# Patient Record
Sex: FEMALE | Race: WHITE | Employment: FULL TIME | URBAN - METROPOLITAN AREA
[De-identification: names, ages, dates, MRNs, and addresses within clinical notes are randomized per-mention and may not be internally consistent; named-entity substitution may affect disease eponyms.]

---

## 2017-05-22 DIAGNOSIS — E78.00 PURE HYPERCHOLESTEROLEMIA: Chronic | ICD-10-CM

## 2017-05-22 DIAGNOSIS — Z00.00 ANNUAL PHYSICAL EXAM: Primary | ICD-10-CM

## 2017-05-22 RX ORDER — LEVOTHYROXINE SODIUM 88 UG/1
88 TABLET ORAL
Qty: 90 TAB | Refills: 0 | Status: SHIPPED | OUTPATIENT
Start: 2017-05-22 | End: 2017-12-12 | Stop reason: SDUPTHER

## 2017-05-22 NOTE — TELEPHONE ENCOUNTER
Patient requesting refill     Requested Prescriptions     Pending Prescriptions Disp Refills    levothyroxine (SYNTHROID) 88 mcg tablet 90 Tab 2     Sig: Take 1 Tab by mouth Daily (before breakfast).

## 2017-06-26 ENCOUNTER — TELEPHONE (OUTPATIENT)
Dept: FAMILY MEDICINE CLINIC | Age: 57
End: 2017-06-26

## 2017-06-28 ENCOUNTER — OFFICE VISIT (OUTPATIENT)
Dept: FAMILY MEDICINE CLINIC | Age: 57
End: 2017-06-28

## 2017-06-28 VITALS
WEIGHT: 160 LBS | RESPIRATION RATE: 16 BRPM | HEART RATE: 73 BPM | TEMPERATURE: 97.7 F | HEIGHT: 64 IN | OXYGEN SATURATION: 97 % | DIASTOLIC BLOOD PRESSURE: 90 MMHG | BODY MASS INDEX: 27.31 KG/M2 | SYSTOLIC BLOOD PRESSURE: 140 MMHG

## 2017-06-28 DIAGNOSIS — F32.0 MILD SINGLE CURRENT EPISODE OF MAJOR DEPRESSIVE DISORDER (HCC): ICD-10-CM

## 2017-06-28 DIAGNOSIS — E03.4 HYPOTHYROIDISM DUE TO ACQUIRED ATROPHY OF THYROID: Primary | ICD-10-CM

## 2017-06-28 DIAGNOSIS — Z91.09 ENVIRONMENTAL ALLERGIES: ICD-10-CM

## 2017-06-28 DIAGNOSIS — Z91.018: ICD-10-CM

## 2017-06-28 DIAGNOSIS — I10 ESSENTIAL HYPERTENSION: Chronic | ICD-10-CM

## 2017-06-28 RX ORDER — ESCITALOPRAM OXALATE 5 MG/1
5 TABLET ORAL DAILY
Qty: 30 TAB | Refills: 1 | Status: SHIPPED | OUTPATIENT
Start: 2017-06-28 | End: 2017-12-12 | Stop reason: SDUPTHER

## 2017-06-28 RX ORDER — MONTELUKAST SODIUM 10 MG/1
10 TABLET ORAL DAILY
Qty: 30 TAB | Refills: 1 | Status: SHIPPED | OUTPATIENT
Start: 2017-06-28 | End: 2017-08-06 | Stop reason: SDUPTHER

## 2017-06-28 NOTE — PROGRESS NOTES
Maxime Arizmendi is a 49 y.o. female here today for thyroid check and fatigue     1. Have you been to the ER, urgent care clinic or hospitalized since your last visit? NO.     2. Have you seen or consulted any other health care providers outside of the [address] [place] [address] since your last visit (Include any pap smears or colon screening)? NO      Do you have an Advanced Directive? NO    Would you like information on Advanced Directives?  NO    Learning Assessment 5/26/2015   PRIMARY LEARNER Patient   HIGHEST LEVEL OF EDUCATION - PRIMARY LEARNER  -   BARRIERS PRIMARY LEARNER -   CO-LEARNER CAREGIVER -   PRIMARY LANGUAGE ENGLISH   LEARNER PREFERENCE PRIMARY LISTENING   ANSWERED BY patient   RELATIONSHIP SELF

## 2017-06-28 NOTE — MR AVS SNAPSHOT
Visit Information Date & Time Provider Department Dept. Phone Encounter #  
 6/28/2017 11:15 AM Taylor Tran, 3 [facility] [phone] [id number] Your Appointments 7/28/2017  8:15 AM  
PHYSICAL with Taylor Tran MD  
3 [facility]) Appt Note: CPE  
 [address] [facility] [id number] [phone]  
  
   
 [address] [address] [address] Upcoming Health Maintenance Date Due Hepatitis C Screening 1960 INFLUENZA AGE 9 TO ADULT 8/1/2017 PAP AKA CERVICAL CYTOLOGY 12/23/2017 BREAST CANCER SCRN MAMMOGRAM 8/4/2018 COLONOSCOPY 5/18/2026 DTaP/Tdap/Td series (2 - Td) 7/27/2026 Allergies as of 6/28/2017  Review Complete On: 6/28/2017 By: Keesha Reed LPN Severity Noted Reaction Type Reactions Ancef [Cefazolin]  01/16/2010   Intolerance Other (comments)  
 headaches Bactrim [Sulfamethoxazole-trimethoprim]  01/16/2010   Intolerance Other (comments)  
 headache Eryc [Erythromycin]  01/16/2010   Side Effect Rash Lisinopril  04/04/2016    Swelling Pcn [Penicillins]  01/16/2010   Side Effect Unknown (comments) Probiotic [Lactobacillus Acidophilus]  04/04/2016    Angioedema Soybean Oil  07/27/2016    Hives Current Immunizations  Never Reviewed Name Date Tdap 7/27/2016  9:11 AM  
  
 Not reviewed this visit You Were Diagnosed With   
  
 Codes Comments Hypothyroidism due to acquired atrophy of thyroid    -  Primary ICD-10-CM: E03.4 ICD-9-CM: 244.8, 246.8 Essential hypertension     ICD-10-CM: I10 
ICD-9-CM: 401.9 Environmental allergies     ICD-10-CM: Z91.09 
ICD-9-CM: V15.09 Soy protein sensitivity     ICD-10-CM: Z91.018 
ICD-9-CM: V15.05 Mild single current episode of major depressive disorder (HCC)     ICD-10-CM: F32.0 ICD-9-CM: 296.21 Vitals BP Pulse Temp Resp Height(growth percentile) Weight(growth percentile) 140/90 (BP 1 Location: Left arm, BP Patient Position: Sitting) 73 97.7 °F (36.5 °C) (Oral) 16 5' 4\" (1.626 m) 160 lb (72.6 kg) SpO2 BMI OB Status Smoking Status 97% 27.46 kg/m2 Menopause Never Smoker Vitals History BMI and BSA Data Body Mass Index Body Surface Area  
 27.46 kg/m 2 1.81 m 2 [facility] [address] [facility], [address] [phone] Your Updated Medication List  
  
   
This list is accurate as of: 6/28/17 12:05 PM.  Always use your most recent med list.  
  
  
  
  
 COQ10 (UBIQUINOL) PO Take  by mouth daily. desloratadine 5 mg tablet Commonly known as:  CLARINEX Take 1 Tab by mouth daily. escitalopram oxalate 5 mg tablet Commonly known as:  Severa Huge Take 1 Tab by mouth daily. Indications: Obsessive-Compulsive Disorder  
  
 levothyroxine 88 mcg tablet Commonly known as:  SYNTHROID Take 1 Tab by mouth Daily (before breakfast). losartan-hydroCHLOROthiazide 50-12.5 mg per tablet Commonly known as:  HYZAAR  
take 1 tablet by mouth once daily  
  
 montelukast 10 mg tablet Commonly known as:  SINGULAIR Take 1 Tab by mouth daily. multivitamin tablet Commonly known as:  ONE A DAY Take 1 tablet by mouth daily. rosuvastatin 10 mg tablet Commonly known as:  CRESTOR Take 1 Tab by mouth nightly. Prescriptions Sent to Pharmacy Refills  
 escitalopram oxalate (LEXAPRO) 5 mg tablet 1 Sig: Take 1 Tab by mouth daily. Indications: Obsessive-Compulsive Disorder Class: Normal  
 Pharmacy: [id number] [address] [facility], [address] [facility] Ph #: [phone] Route: Oral  
 montelukast (SINGULAIR) 10 mg tablet 1 Sig: Take 1 Tab by mouth daily.   
 Class: Normal  
 Pharmacy: [facility] [address], [address] Kristy Stout MIGEL.  #: [phone] Route: Oral  
  
We Performed the Following REFERRAL TO NUTRITION [REF50 Custom] To-Do List   
 06/28/2017 Imaging:  US THYROID/PARATHYROID/SOFT TISS Referral Information Referral ID Referred By Referred To  
  
 [id number] Amina STONE Not Available Visits Status Start Date End Date 1 New Request 6/28/17 6/28/18 If your referral has a status of pending review or denied, additional information will be sent to support the outcome of this decision. Patient Instructions Hypothyroidism: Care Instructions Your Care Instructions You have hypothyroidism, which means that your body is not making enough thyroid hormone. This hormone helps your body use energy. If your thyroid level is low, you may feel tired, be constipated, have an increase in your blood pressure, or have dry skin or memory problems. You may also get cold easily, even when it is warm. Women with low thyroid levels may have heavy menstrual periods. A blood test to find your thyroid-stimulating hormone (TSH) level is used to check for hypothyroidism. A high TSH level may mean that you have low thyroid. When your body is not making enough thyroid hormone, TSH levels rise in an effort to make the body produce more. The treatment for hypothyroidism is to take thyroid hormone pills. You should start to feel better in 1 to 2 weeks. But it can take several months to see changes in the TSH level. You will need regular visits with your doctor to make sure you have the right dose of medicine. Most people need treatment for the rest of their lives. You will need to see your doctor regularly to have blood tests and to make sure you are doing well. Follow-up care is a key part of your treatment and safety. Be sure to make and go to all appointments, and call your doctor if you are having problems.  Its also a good idea to know your test results and keep a list of the medicines you take. How can you care for yourself at home? · Take your thyroid hormone medicine exactly as prescribed. Call your doctor if you think you are having a problem with your medicine. Most people do not have side effects if they take the right amount of medicine regularly. ¨ Take the medicine 30 minutes before breakfast, and do not take it with calcium, vitamins, or iron. ¨ Do not take extra doses of your thyroid medicine. It will not help you get better any faster, and it may cause side effects. ¨ If you forget to take a dose, do NOT take a double dose of medicine. Take your usual dose the next day. · Tell your doctor about all prescription, herbal, or over-the-counter products you take. · Take care of yourself. Eat a healthy diet, get enough sleep, and get regular exercise. When should you call for help? Call 911 anytime you think you may need emergency care. For example, call if: 
· You passed out (lost consciousness). · You have severe trouble breathing. · You have a very slow heartbeat (less than 60 beats a minute). · You have a low body temperature (95°F or below). Call your doctor now or seek immediate medical care if: 
· You feel tired, sluggish, or weak. · You have trouble remembering things or concentrating. · You do not begin to feel better 2 weeks after starting your medicine. Watch closely for changes in your health, and be sure to contact your doctor if you have any problems. Where can you learn more? Go to http://nicloa-kobe.info/. Enter L875 in the search box to learn more about \"Hypothyroidism: Care Instructions. \" Current as of: July 28, 2016 Content Version: 11.3 © 7361-4859 [email or web address]. Care instructions adapted under license by [employer] (which disclaims liability or warranty for this information).  If you have questions about a medical condition or this instruction, always ask your healthcare professional. Hayder Mcgee any warranty or liability for your use of this information. Introducing [facility]! Dear Ginger Fletcher: Thank you for requesting a Edyn account. Our records indicate that you already have an active Edyn account. You can access your account anytime at [email or web address] Did you know that you can access your hospital and ER discharge instructions at any time in Edyn? You can also review all of your test results from your hospital stay or ER visit. Additional Information If you have questions, please visit the Frequently Asked Questions section of the Edyn website at [email or web address] Remember, Edyn is NOT to be used for urgent needs. For medical emergencies, dial 911. Now available from your iPhone and Android! Please provide this summary of care documentation to your next provider. Your primary care clinician is listed as Nichelle Hoskins. If you have any questions after today's visit, please call [phone].

## 2017-06-28 NOTE — PROGRESS NOTES
Assessment/Plan:    Hilda Tucker was seen today for thyroid problem, fatigue and allergies. Diagnoses and all orders for this visit:    Hypothyroidism due to acquired atrophy of thyroid  -     US THYROID/PARATHYROID/SOFT TISS; Future    Essential hypertension    Environmental allergies  -     montelukast (SINGULAIR) 10 mg tablet; Take 1 Tab by mouth daily. Soy protein sensitivity  -     REFERRAL TO NUTRITION    Mild single current episode of major depressive disorder (HCC)  -     escitalopram oxalate (LEXAPRO) 5 mg tablet; Take 1 Tab by mouth daily. Indications: Obsessive-Compulsive Disorder      Will return for her physical and do labs. Will call her prior to visit with her Thyroid levels. The plan was discussed with the patient. The patient verbalized understanding and is in agreement with the plan. All medication potential side effects were discussed with the patient.    -------------------------------------------------------------------------------------------------------------------        Elizabeth Reed is a 49 y.o. female and presents with Thyroid Problem; Fatigue; and Allergies         Subjective:  Pt here for f/u. Pt has regained weight. Has been disappointed in this, feeling down. She has found it a struggle to find foods w/o soy. She came to realize she was allergic to this months ago. the strict diet that she has to adhere to has made it hard to go out to dinner, go to other people's homes for dinner, just makes life difficult. She has a soy allergy. She develops rashes, swelling of the body so she has to be careful. Has had to use epipen in the past.  The amount she consumes is the issue. Any oils with soy will always affect her. Hypothyroidism:  Has also been having a heaviness in her throat/neck. Has not had an US. HTN: borderline. She feels her mood has been affected by things above. Feels she needs to go on medication.   She has been on meds years ago.      ROS:  Constitutional: No recent weight change. No weakness/fatigue. No f/c. Skin: No rashes, change in nails/hair, itching   HENT: No HA, dizziness. No hearing loss/tinnitus. No nasal congestion/discharge. Eyes: No change in vision, double/blurred vision or eye pain/redness. Cardiovascular: No CP/palpitations. No HIDALGO/orthopnea/PND. Respiratory: No cough/sputum, dyspnea, wheezing. Gastointestinal: No dysphagia, reflux. No n/v. No constipation/diarrhea. No melena/rectal bleeding. Genitourinary: No dysuria, urinary hesitancy, nocturia, hematuria. No incontinence. Musculoskeletal: No joint pain/stiffness. No muscle pain/tenderness. Endo: No heat/cold intolerance, no polyuria/polydypsia. Heme: No h/o anemia. No easy bleeding/bruising. Allergy/Immunology: No seasonal rhinitis. Denies frequent colds, sinus/ear infections. Neurological: No seizures/numbness/weakness. No paresthesias. Psychiatric:  No depression, anxiety. The problem list was updated as a part of today's visit. Patient Active Problem List   Diagnosis Code    HTN (hypertension) I10    Hyperlipidemia E78.5    Lupus ([facility] [address] M32.9    Migraine G43.909    Anxiety F41.9    Endometriosis N80.9    Cervical herniated disc M50.20    Hypothyroidism E03.9       The PSH, FH were reviewed. SH:  Social History   Substance Use Topics    Smoking status: Never Smoker    Smokeless tobacco: Never Used    Alcohol use No       Medications/Allergies:  Current Outpatient Prescriptions on File Prior to Visit   Medication Sig Dispense Refill    levothyroxine (SYNTHROID) 88 mcg tablet Take 1 Tab by mouth Daily (before breakfast). 90 Tab 0    losartan-hydroCHLOROthiazide (HYZAAR) 50-12.5 mg per tablet take 1 tablet by mouth once daily 90 Tab 0    rosuvastatin (CRESTOR) 10 mg tablet Take 1 Tab by mouth nightly. 90 Tab 2    multivitamin (ONE A DAY) tablet Take 1 tablet by mouth daily.       COQ10, UBIQUINOL, PO Take  by mouth daily.  desloratadine (CLARINEX) 5 mg tablet Take 1 Tab by mouth daily. 90 Tab 2     No current facility-administered medications on file prior to visit. Allergies   Allergen Reactions    Ancef [Cefazolin] Other (comments)     headaches    Bactrim [Sulfamethoxazole-Trimethoprim] Other (comments)     headache    Eryc [Erythromycin] Rash    Lisinopril Swelling    Pcn [Penicillins] Unknown (comments)    Probiotic [Lactobacillus Acidophilus] Angioedema    Soybean Oil Hives         Health Maintenance:   Health Maintenance   Topic Date Due    Hepatitis C Screening  1960    INFLUENZA AGE 9 TO ADULT  08/01/2017    PAP AKA CERVICAL CYTOLOGY  12/23/2017    BREAST CANCER SCRN MAMMOGRAM  08/04/2018    COLONOSCOPY  05/18/2026    DTaP/Tdap/Td series (2 - Td) 07/27/2026       Objective:  Visit Vitals    /90 (BP 1 Location: Left arm, BP Patient Position: Sitting)    Pulse 73    Temp 97.7 °F (36.5 °C) (Oral)    Resp 16    Ht 5' 4\" (1.626 m)    Wt 160 lb (72.6 kg)    SpO2 97%    BMI 27.46 kg/m2          Nurses notes and VS reviewed.       Physical Examination: General appearance - alert, well appearing, and in no distress        Labwork and Ancillary Studies:    CBC w/Diff  Lab Results   Component Value Date/Time    WBC 5.4 07/19/2016 07:22 AM    HGB 13.8 07/19/2016 07:22 AM    PLATELET 119 51/53/1111 07:22 AM         Basic Metabolic Profile  Lab Results   Component Value Date/Time    Sodium 141 07/19/2016 07:22 AM    Potassium 3.9 07/19/2016 07:22 AM    Chloride 102 07/19/2016 07:22 AM    CO2 29 07/19/2016 07:22 AM    Anion gap 10 07/19/2016 07:22 AM    Glucose 91 07/19/2016 07:22 AM    BUN 13 07/19/2016 07:22 AM    Creatinine 0.76 07/19/2016 07:22 AM    BUN/Creatinine ratio 17 07/19/2016 07:22 AM    GFR est AA >60 07/19/2016 07:22 AM    GFR est non-AA >60 07/19/2016 07:22 AM    Calcium 9.6 07/19/2016 07:22 AM         LFT  Lab Results   Component Value Date/Time    ALT (SGPT) 71 07/19/2016 07:22 AM    AST (SGOT) 36 07/19/2016 07:22 AM    Alk.  phosphatase 98 07/19/2016 07:22 AM    Bilirubin, direct 0.4 07/19/2016 07:22 AM    Bilirubin, total 1.7 07/19/2016 07:22 AM         Cholesterol  Lab Results   Component Value Date/Time    Cholesterol, total 152 07/19/2016 07:22 AM    HDL Cholesterol 71 07/19/2016 07:22 AM    LDL, calculated 57 07/19/2016 07:22 AM    Triglyceride 120 07/19/2016 07:22 AM    CHOL/HDL Ratio 2.1 07/19/2016 07:22 AM

## 2017-06-28 NOTE — PATIENT INSTRUCTIONS
Hypothyroidism: Care Instructions  Your Care Instructions  You have hypothyroidism, which means that your body is not making enough thyroid hormone. This hormone helps your body use energy. If your thyroid level is low, you may feel tired, be constipated, have an increase in your blood pressure, or have dry skin or memory problems. You may also get cold easily, even when it is warm. Women with low thyroid levels may have heavy menstrual periods. A blood test to find your thyroid-stimulating hormone (TSH) level is used to check for hypothyroidism. A high TSH level may mean that you have low thyroid. When your body is not making enough thyroid hormone, TSH levels rise in an effort to make the body produce more. The treatment for hypothyroidism is to take thyroid hormone pills. You should start to feel better in 1 to 2 weeks. But it can take several months to see changes in the TSH level. You will need regular visits with your doctor to make sure you have the right dose of medicine. Most people need treatment for the rest of their lives. You will need to see your doctor regularly to have blood tests and to make sure you are doing well. Follow-up care is a key part of your treatment and safety. Be sure to make and go to all appointments, and call your doctor if you are having problems. Its also a good idea to know your test results and keep a list of the medicines you take. How can you care for yourself at home? · Take your thyroid hormone medicine exactly as prescribed. Call your doctor if you think you are having a problem with your medicine. Most people do not have side effects if they take the right amount of medicine regularly. ¨ Take the medicine 30 minutes before breakfast, and do not take it with calcium, vitamins, or iron. ¨ Do not take extra doses of your thyroid medicine. It will not help you get better any faster, and it may cause side effects.   ¨ If you forget to take a dose, do NOT take a double dose of medicine. Take your usual dose the next day. · Tell your doctor about all prescription, herbal, or over-the-counter products you take. · Take care of yourself. Eat a healthy diet, get enough sleep, and get regular exercise. When should you call for help? Call 911 anytime you think you may need emergency care. For example, call if:  · You passed out (lost consciousness). · You have severe trouble breathing. · You have a very slow heartbeat (less than 60 beats a minute). · You have a low body temperature (95°F or below). Call your doctor now or seek immediate medical care if:  · You feel tired, sluggish, or weak. · You have trouble remembering things or concentrating. · You do not begin to feel better 2 weeks after starting your medicine. Watch closely for changes in your health, and be sure to contact your doctor if you have any problems. Where can you learn more? Go to http://nciola-kobe.info/. Enter M253 in the search box to learn more about \"Hypothyroidism: Care Instructions. \"  Current as of: July 28, 2016  Content Version: 11.3  © 1041-9409 [employer]. Care instructions adapted under license by [employer] (which disclaims liability or warranty for this information). If you have questions about a medical condition or this instruction, always ask your healthcare professional. [place] [address] any warranty or liability for your use of this information. Hypothyroidism: Care Instructions  Your Care Instructions  You have hypothyroidism, which means that your body is not making enough thyroid hormone. This hormone helps your body use energy. If your thyroid level is low, you may feel tired, be constipated, have an increase in your blood pressure, or have dry skin or memory problems. You may also get cold easily, even when it is warm. Women with low thyroid levels may have heavy menstrual periods.   A blood test to find your thyroid-stimulating hormone (TSH) level is used to check for hypothyroidism. A high TSH level may mean that you have low thyroid. When your body is not making enough thyroid hormone, TSH levels rise in an effort to make the body produce more. The treatment for hypothyroidism is to take thyroid hormone pills. You should start to feel better in 1 to 2 weeks. But it can take several months to see changes in the TSH level. You will need regular visits with your doctor to make sure you have the right dose of medicine. Most people need treatment for the rest of their lives. You will need to see your doctor regularly to have blood tests and to make sure you are doing well. Follow-up care is a key part of your treatment and safety. Be sure to make and go to all appointments, and call your doctor if you are having problems. Its also a good idea to know your test results and keep a list of the medicines you take. How can you care for yourself at home? · Take your thyroid hormone medicine exactly as prescribed. Call your doctor if you think you are having a problem with your medicine. Most people do not have side effects if they take the right amount of medicine regularly. ¨ Take the medicine 30 minutes before breakfast, and do not take it with calcium, vitamins, or iron. ¨ Do not take extra doses of your thyroid medicine. It will not help you get better any faster, and it may cause side effects. ¨ If you forget to take a dose, do NOT take a double dose of medicine. Take your usual dose the next day. · Tell your doctor about all prescription, herbal, or over-the-counter products you take. · Take care of yourself. Eat a healthy diet, get enough sleep, and get regular exercise. When should you call for help? Call 911 anytime you think you may need emergency care. For example, call if:  · You passed out (lost consciousness). · You have severe trouble breathing.   · You have a very slow heartbeat (less than 60 beats a minute). · You have a low body temperature (95°F or below). Call your doctor now or seek immediate medical care if:  · You feel tired, sluggish, or weak. · You have trouble remembering things or concentrating. · You do not begin to feel better 2 weeks after starting your medicine. Watch closely for changes in your health, and be sure to contact your doctor if you have any problems. Where can you learn more? Go to http://nicola-kobe.info/. Enter S152 in the search box to learn more about \"Hypothyroidism: Care Instructions. \"  Current as of: July 28, 2016  Content Version: 11.3  © 5753-1167 [employer]. Care instructions adapted under license by [employer] (which disclaims liability or warranty for this information). If you have questions about a medical condition or this instruction, always ask your healthcare professional. [address] any warranty or liability for your use of this information.

## 2017-07-03 ENCOUNTER — HOSPITAL ENCOUNTER (OUTPATIENT)
Dept: LAB | Age: 57
Discharge: HOME | End: 2017-07-03
Payer: COMMERCIAL

## 2017-07-03 DIAGNOSIS — Z00.00 ANNUAL PHYSICAL EXAM: ICD-10-CM

## 2017-07-03 DIAGNOSIS — E78.00 PURE HYPERCHOLESTEROLEMIA: Chronic | ICD-10-CM

## 2017-07-03 LAB
25(OH)D3 SERPL-MCNC: 41.7 NG/ML (ref 30–100)
ALBUMIN SERPL BCP-MCNC: 4 G/DL (ref 3.4–5)
ALBUMIN/GLOB SERPL: 1.1 {RATIO} (ref 0.8–1.7)
ALP SERPL-CCNC: 86 U/L (ref 45–117)
ALT SERPL-CCNC: 91 U/L (ref 13–56)
ANION GAP BLD CALC-SCNC: 9 MMOL/L (ref 3–18)
APPEARANCE UR: CLEAR
AST SERPL W P-5'-P-CCNC: 36 U/L (ref 15–37)
BACTERIA URNS QL MICRO: ABNORMAL /HPF
BASOPHILS # BLD AUTO: 0 K/UL (ref 0–0.06)
BASOPHILS # BLD: 1 % (ref 0–2)
BILIRUB DIRECT SERPL-MCNC: 0.3 MG/DL (ref 0–0.2)
BILIRUB SERPL-MCNC: 1.3 MG/DL (ref 0.2–1)
BILIRUB UR QL: NEGATIVE
BUN SERPL-MCNC: 11 MG/DL (ref 7–18)
BUN/CREAT SERPL: 15 (ref 12–20)
CALCIUM SERPL-MCNC: 9.3 MG/DL (ref 8.5–10.1)
CHLORIDE SERPL-SCNC: 104 MMOL/L (ref 100–108)
CHOLEST SERPL-MCNC: 220 MG/DL
CO2 SERPL-SCNC: 28 MMOL/L (ref 21–32)
COLOR UR: YELLOW
CREAT SERPL-MCNC: 0.73 MG/DL (ref 0.6–1.3)
DIFFERENTIAL METHOD BLD: NORMAL
EOSINOPHIL # BLD: 0 K/UL (ref 0–0.4)
EOSINOPHIL NFR BLD: 0 % (ref 0–5)
EPITH CASTS URNS QL MICRO: ABNORMAL /LPF (ref 0–5)
ERYTHROCYTE [DISTWIDTH] IN BLOOD BY AUTOMATED COUNT: 13.3 % (ref 11.6–14.5)
GLOBULIN SER CALC-MCNC: 3.7 G/DL (ref 2–4)
GLUCOSE SERPL-MCNC: 91 MG/DL (ref 74–99)
GLUCOSE UR STRIP.AUTO-MCNC: NEGATIVE MG/DL
HCT VFR BLD AUTO: 42.9 % (ref 35–45)
HDLC SERPL-MCNC: 79 MG/DL (ref 40–60)
HDLC SERPL: 2.8 {RATIO} (ref 0–5)
HGB BLD-MCNC: 13.9 G/DL (ref 12–16)
HGB UR QL STRIP: NEGATIVE
KETONES UR QL STRIP.AUTO: NEGATIVE MG/DL
LDLC SERPL CALC-MCNC: 115.6 MG/DL (ref 0–100)
LEUKOCYTE ESTERASE UR QL STRIP.AUTO: ABNORMAL
LIPID PROFILE,FLP: ABNORMAL
LYMPHOCYTES # BLD AUTO: 37 % (ref 21–52)
LYMPHOCYTES # BLD: 1.8 K/UL (ref 0.9–3.6)
MCH RBC QN AUTO: 30.3 PG (ref 24–34)
MCHC RBC AUTO-ENTMCNC: 32.4 G/DL (ref 31–37)
MCV RBC AUTO: 93.7 FL (ref 74–97)
MONOCYTES # BLD: 0.3 K/UL (ref 0.05–1.2)
MONOCYTES NFR BLD AUTO: 6 % (ref 3–10)
NEUTS SEG # BLD: 2.7 K/UL (ref 1.8–8)
NEUTS SEG NFR BLD AUTO: 56 % (ref 40–73)
NITRITE UR QL STRIP.AUTO: NEGATIVE
PH UR STRIP: 7 [PH] (ref 5–8)
PLATELET # BLD AUTO: 358 K/UL (ref 135–420)
PMV BLD AUTO: 9.7 FL (ref 9.2–11.8)
POTASSIUM SERPL-SCNC: 4.7 MMOL/L (ref 3.5–5.5)
PROT SERPL-MCNC: 7.7 G/DL (ref 6.4–8.2)
PROT UR STRIP-MCNC: NEGATIVE MG/DL
RBC # BLD AUTO: 4.58 M/UL (ref 4.2–5.3)
RBC #/AREA URNS HPF: 0 /HPF (ref 0–5)
SODIUM SERPL-SCNC: 141 MMOL/L (ref 136–145)
SP GR UR REFRACTOMETRY: 1.01 (ref 1–1.03)
T4 FREE SERPL-MCNC: 1.5 NG/DL (ref 0.7–1.5)
TRIGL SERPL-MCNC: 127 MG/DL (ref ?–150)
TSH SERPL DL<=0.05 MIU/L-ACNC: 0.42 UIU/ML (ref 0.36–3.74)
UROBILINOGEN UR QL STRIP.AUTO: 0.2 EU/DL (ref 0.2–1)
VLDLC SERPL CALC-MCNC: 25.4 MG/DL
WBC # BLD AUTO: 4.8 K/UL (ref 4.6–13.2)
WBC URNS QL MICRO: ABNORMAL /HPF (ref 0–4)

## 2017-07-03 PROCEDURE — 84439 ASSAY OF FREE THYROXINE: CPT | Performed by: INTERNAL MEDICINE

## 2017-07-03 PROCEDURE — 81001 URINALYSIS AUTO W/SCOPE: CPT | Performed by: INTERNAL MEDICINE

## 2017-07-03 PROCEDURE — 80061 LIPID PANEL: CPT | Performed by: INTERNAL MEDICINE

## 2017-07-03 PROCEDURE — 85025 COMPLETE CBC W/AUTO DIFF WBC: CPT | Performed by: INTERNAL MEDICINE

## 2017-07-03 PROCEDURE — 84443 ASSAY THYROID STIM HORMONE: CPT | Performed by: INTERNAL MEDICINE

## 2017-07-03 PROCEDURE — 82306 VITAMIN D 25 HYDROXY: CPT | Performed by: INTERNAL MEDICINE

## 2017-07-03 PROCEDURE — 36415 COLL VENOUS BLD VENIPUNCTURE: CPT | Performed by: INTERNAL MEDICINE

## 2017-07-03 PROCEDURE — 80076 HEPATIC FUNCTION PANEL: CPT | Performed by: INTERNAL MEDICINE

## 2017-07-03 PROCEDURE — 80048 BASIC METABOLIC PNL TOTAL CA: CPT | Performed by: INTERNAL MEDICINE

## 2017-07-24 DIAGNOSIS — E03.4 HYPOTHYROIDISM DUE TO ACQUIRED ATROPHY OF THYROID: ICD-10-CM

## 2017-07-28 ENCOUNTER — OFFICE VISIT (OUTPATIENT)
Dept: FAMILY MEDICINE CLINIC | Age: 57
End: 2017-07-28

## 2017-07-28 VITALS
HEART RATE: 86 BPM | DIASTOLIC BLOOD PRESSURE: 80 MMHG | SYSTOLIC BLOOD PRESSURE: 132 MMHG | WEIGHT: 160.8 LBS | OXYGEN SATURATION: 98 % | BODY MASS INDEX: 27.45 KG/M2 | HEIGHT: 64 IN | TEMPERATURE: 97.7 F | RESPIRATION RATE: 18 BRPM

## 2017-07-28 DIAGNOSIS — Z78.0 POSTMENOPAUSAL: ICD-10-CM

## 2017-07-28 DIAGNOSIS — Z00.00 ANNUAL PHYSICAL EXAM: Primary | ICD-10-CM

## 2017-07-28 DIAGNOSIS — I10 ESSENTIAL HYPERTENSION: Chronic | ICD-10-CM

## 2017-07-28 DIAGNOSIS — E78.00 PURE HYPERCHOLESTEROLEMIA: Chronic | ICD-10-CM

## 2017-07-28 DIAGNOSIS — Z12.39 BREAST CANCER SCREENING: ICD-10-CM

## 2017-07-28 DIAGNOSIS — E03.4 HYPOTHYROIDISM DUE TO ACQUIRED ATROPHY OF THYROID: ICD-10-CM

## 2017-07-28 RX ORDER — CHOLECALCIFEROL (VITAMIN D3) 125 MCG
CAPSULE ORAL DAILY
COMMUNITY

## 2017-07-28 RX ORDER — PHENTERMINE HYDROCHLORIDE 37.5 MG/1
37.5 TABLET ORAL
Qty: 30 TAB | Refills: 1 | Status: SHIPPED | OUTPATIENT
Start: 2017-07-28 | End: 2018-08-22

## 2017-07-28 NOTE — MR AVS SNAPSHOT
Visit Information Date & Time Provider Department Dept. Phone Encounter #  
 7/28/2017  8:15 AM Alden Portal, [facility] [phone] [id number] Upcoming Health Maintenance Date Due Hepatitis C Screening 1960 INFLUENZA AGE 9 TO ADULT 8/1/2017 PAP AKA CERVICAL CYTOLOGY 12/23/2017 BREAST CANCER SCRN MAMMOGRAM 8/4/2018 COLONOSCOPY 5/18/2026 DTaP/Tdap/Td series (2 - Td) 7/27/2026 Allergies as of 7/28/2017  Review Complete On: 7/28/2017 By: Carlos Alberto Cole LPN Severity Noted Reaction Type Reactions Ancef [Cefazolin]  01/16/2010   Intolerance Other (comments)  
 headaches Bactrim [Sulfamethoxazole-trimethoprim]  01/16/2010   Intolerance Other (comments)  
 headache Eryc [Erythromycin]  01/16/2010   Side Effect Rash Lisinopril  04/04/2016    Swelling Pcn [Penicillins]  01/16/2010   Side Effect Unknown (comments) Probiotic [Lactobacillus Acidophilus]  04/04/2016    Angioedema Soybean Oil  07/27/2016    Hives Current Immunizations  Never Reviewed Name Date Tdap 7/27/2016  9:11 AM  
  
 Not reviewed this visit You Were Diagnosed With   
  
 Codes Comments Annual physical exam    -  Primary ICD-10-CM: Z00.00 ICD-9-CM: V70.0 Essential hypertension     ICD-10-CM: I10 
ICD-9-CM: 401.9 Pure hypercholesterolemia     ICD-10-CM: E78.00 ICD-9-CM: 272.0 Hypothyroidism due to acquired atrophy of thyroid     ICD-10-CM: E03.4 ICD-9-CM: 244.8, 246.8 Postmenopausal     ICD-10-CM: Z78.0 ICD-9-CM: V49.81 Breast cancer screening     ICD-10-CM: Z12.39 
ICD-9-CM: V76.10 Vitals BP Pulse Temp Resp Height(growth percentile) Weight(growth percentile) 132/80 86 97.7 °F (36.5 °C) 18 5' 4\" (1.626 m) 160 lb 12.8 oz (72.9 kg) LMP SpO2 BMI OB Status Smoking Status 10/18/2011 98% 27.6 kg/m2 Postmenopausal Never Smoker Vitals History BMI and BSA Data Body Mass Index Body Surface Area  
 27.6 kg/m 2 1.81 m 2 Preferred Pharmacy Pharmacy Name Phone [id number] [address] [facility], [address] [phone] Your Updated Medication List  
  
   
This list is accurate as of: 7/28/17  8:54 AM.  Always use your most recent med list.  
  
  
  
  
 COQ10 (UBIQUINOL) PO Take  by mouth daily. desloratadine 5 mg tablet Commonly known as:  CLARINEX Take 1 Tab by mouth daily. escitalopram oxalate 5 mg tablet Commonly known as:  Leigha Polio Take 1 Tab by mouth daily. Indications: Obsessive-Compulsive Disorder  
  
 levothyroxine 88 mcg tablet Commonly known as:  SYNTHROID Take 1 Tab by mouth Daily (before breakfast). losartan-hydroCHLOROthiazide 50-12.5 mg per tablet Commonly known as:  HYZAAR  
take 1 tablet by mouth once daily  
  
 montelukast 10 mg tablet Commonly known as:  SINGULAIR Take 1 Tab by mouth daily. multivitamin tablet Commonly known as:  ONE A DAY Take 1 tablet by mouth daily. phentermine 37.5 mg tablet Commonly known as:  ADIPEX-P Take 1 Tab by mouth every morning. Max Daily Amount: 37.5 mg.  
  
 rosuvastatin 10 mg tablet Commonly known as:  CRESTOR Take 1 Tab by mouth nightly. VITAMIN D3 2,000 unit Tab Generic drug:  cholecalciferol (vitamin D3) Take  by mouth. Prescriptions Printed Refills  
 phentermine (ADIPEX-P) 37.5 mg tablet 1 Sig: Take 1 Tab by mouth every morning. Max Daily Amount: 37.5 mg.  
 Class: Print Route: Oral  
  
To-Do List   
 07/28/2017 Imaging:  DEXA BONE DENSITY STUDY AXIAL   
  
 07/28/2017 Imaging:  CHRISTIANO MAMMO BI SCREENING INCL CAD Referral Information Referral ID Referred By Referred To  
  
 [id number] Kortney STONE Not Available Visits Status Start Date End Date 1 New Request 7/28/17 7/28/18  If your referral has a status of pending review or denied, additional information will be sent to support the outcome of this decision. Patient Instructions Well Visit, Women 48 to 72: Care Instructions Your Care Instructions Physical exams can help you stay healthy. Your doctor has checked your overall health and may have suggested ways to take good care of yourself. He or she also may have recommended tests. At home, you can help prevent illness with healthy eating, regular exercise, and other steps. Follow-up care is a key part of your treatment and safety. Be sure to make and go to all appointments, and call your doctor if you are having problems. It's also a good idea to know your test results and keep a list of the medicines you take. How can you care for yourself at home? · Reach and stay at a healthy weight. This will lower your risk for many problems, such as obesity, diabetes, heart disease, and high blood pressure. · Get at least 30 minutes of exercise on most days of the week. Walking is a good choice. You also may want to do other activities, such as running, swimming, cycling, or playing tennis or team sports. · Do not smoke. Smoking can make health problems worse. If you need help quitting, talk to your doctor about stop-smoking programs and medicines. These can increase your chances of quitting for good. · Protect your skin from too much sun. When you're outdoors from 10 a.m. to 4 p.m., stay in the shade or cover up with clothing and a hat with a wide brim. Wear sunglasses that block UV rays. Even when it's cloudy, put broad-spectrum sunscreen (SPF 30 or higher) on any exposed skin. · See a dentist one or two times a year for checkups and to have your teeth cleaned. · Wear a seat belt in the car. · Limit alcohol to 1 drink a day. Too much alcohol can cause health problems. Follow your doctor's advice about when to have certain tests. These tests can spot problems early. · Cholesterol.  Your doctor will tell you how often to have this done based on your age, family history, or other things that can increase your risk for heart attack and stroke. · Blood pressure. Have your blood pressure checked during a routine doctor visit. Your doctor will tell you how often to check your blood pressure based on your age, your blood pressure results, and other factors. · Mammogram. Ask your doctor how often you should have a mammogram, which is an X-ray of your breasts. A mammogram can spot breast cancer before it can be felt and when it is easiest to treat. · Pap test and pelvic exam. Ask your doctor how often you should have a Pap test. You may not need to have a Pap test as often as you used to. · Vision. Have your eyes checked every year or two or as often as your doctor suggests. Some experts recommend that you have yearly exams for glaucoma and other age-related eye problems starting at age 48. · Hearing. Tell your doctor if you notice any change in your hearing. You can have tests to find out how well you hear. · Diabetes. Ask your doctor whether you should have tests for diabetes. · Colon cancer. You should begin tests for colon cancer at age 48. You may have one of several tests. Your doctor will tell you how often to have tests based on your age and risk. Risks include whether you already had a precancerous polyp removed from your colon or whether your parents, sisters and brothers, or children have had colon cancer. · Thyroid disease. Talk to your doctor about whether to have your thyroid checked as part of a regular physical exam. Women have an increased chance of a thyroid problem. · Osteoporosis. You should begin tests for bone density at age 72. If you are younger than 72, ask your doctor whether you have factors that may increase your risk for this disease. You may want to have this test before age 72. · Heart attack and stroke risk. At least every 4 to 6 years, you should have your risk for heart attack and stroke assessed.  Your doctor uses factors such as your age, blood pressure, cholesterol, and whether you smoke or have diabetes to show what your risk for a heart attack or stroke is over the next 10 years. When should you call for help? Watch closely for changes in your health, and be sure to contact your doctor if you have any problems or symptoms that concern you. Where can you learn more? Go to http://nicola-kobe.info/. Enter Y069 in the search box to learn more about \"Well Visit, Women 50 to 72: Care Instructions. \" Current as of: July 19, 2016 Content Version: 11.3 © 5355-5925 [employer]. Care instructions adapted under license by [employer] (which disclaims liability or warranty for this information). If you have questions about a medical condition or this instruction, always ask your healthcare professional. [address] any warranty or liability for your use of this information. Introducing [facility]! Dear Vale Reagan: Thank you for requesting a [employer] account. Our records indicate that you already have an active [employer] account. You can access your account anytime at [email or web address]. [email or web address] Did you know that you can access your hospital and ER discharge instructions at any time in [employer]? You can also review all of your test results from your hospital stay or ER visit. Additional Information If you have questions, please visit the Frequently Asked Questions section of the [employer] website at [email or web address] Remember, [employer] is NOT to be used for urgent needs. For medical emergencies, dial 911. Now available from your iPhone and Android! Please provide this summary of care documentation to your next provider. Your primary care clinician is listed as Nichelle 51. If you have any questions after today's visit, please call [phone].

## 2017-07-28 NOTE — PROGRESS NOTES
SUBJECTIVE:   49 y.o. female for annual routine checkup. Current Outpatient Prescriptions   Medication Sig Dispense Refill    cholecalciferol, vitamin D3, (VITAMIN D3) 2,000 unit tab Take  by mouth.  phentermine (ADIPEX-P) 37.5 mg tablet Take 1 Tab by mouth every morning. Max Daily Amount: 37.5 mg. 30 Tab 1    escitalopram oxalate (LEXAPRO) 5 mg tablet Take 1 Tab by mouth daily. Indications: Obsessive-Compulsive Disorder 30 Tab 1    montelukast (SINGULAIR) 10 mg tablet Take 1 Tab by mouth daily. 30 Tab 1    levothyroxine (SYNTHROID) 88 mcg tablet Take 1 Tab by mouth Daily (before breakfast). 90 Tab 0    losartan-hydroCHLOROthiazide (HYZAAR) 50-12.5 mg per tablet take 1 tablet by mouth once daily 90 Tab 0    desloratadine (CLARINEX) 5 mg tablet Take 1 Tab by mouth daily. 90 Tab 2    rosuvastatin (CRESTOR) 10 mg tablet Take 1 Tab by mouth nightly. 90 Tab 2    multivitamin (ONE A DAY) tablet Take 1 tablet by mouth daily.  COQ10, UBIQUINOL, PO Take  by mouth daily. Past Medical History:   Diagnosis Date    Anxiety 1/16/2010    Cervical herniated disc 4/28/2014    Dr. Carlos King ([address]).  Endometriosis 10/28/2011    Fatty liver 7/28/2017    HTN (hypertension) 1/16/2010    Hypercholesterolemia     Hyperlipidemia 1/16/2010    Hypothyroidism 1/22/2015    Lupus ([address] 1/16/2010    Migraine 1/16/2010    Sinusitis        Allergies: Ancef [cefazolin]; Bactrim [sulfamethoxazole-trimethoprim]; Eryc [erythromycin]; Lisinopril; Pcn [penicillins]; Probiotic [lactobacillus acidophilus]; and Soybean oil   Patient's last menstrual period was 10/18/2011. ROS:  Feeling well. No dyspnea or chest pain on exertion. No abdominal pain, change in bowel habits, black or bloody stools. No urinary tract symptoms. GYN ROS: no breast pain or new or enlarging lumps on self exam. No neurological complaints. OBJECTIVE:   The patient appears well, alert, oriented x 3, in no distress.     Visit Vitals    /80    Pulse 86    Temp 97.7 °F (36.5 °C)    Resp 18    Ht 5' 4\" (1.626 m)    Wt 160 lb 12.8 oz (72.9 kg)    LMP 10/18/2011    SpO2 98%    BMI 27.6 kg/m2       General appearance: alert, cooperative, no distress, appears stated age  Head: Normocephalic, without obvious abnormality, atraumatic  Ears: normal TM's and external ear canals AU  Throat: Lips, mucosa, and tongue normal. Teeth and gums normal  Neck: supple, symmetrical, trachea midline, no adenopathy, thyroid: not enlarged, symmetric, no tenderness/mass/nodules, no carotid bruit and no JVD  Back: symmetric, no curvature. ROM normal. No CVA tenderness. Lungs: clear to auscultation bilaterally  Heart: regular rate and rhythm, S1, S2 normal, no murmur, click, rub or gallop  Abdomen: soft, non-tender. Bowel sounds normal. No masses,  no organomegaly  Extremities: extremities normal, atraumatic, no cyanosis or edema  Pulses: 2+ and symmetric  Skin: Skin color, texture, turgor normal. No rashes or lesions  Neurological is normal, no focal findings. Lab Results   Component Value Date/Time    WBC 4.8 07/03/2017 08:00 AM    HGB 13.9 07/03/2017 08:00 AM    HCT 42.9 07/03/2017 08:00 AM    PLATELET 267 41/92/0550 08:00 AM    MCV 93.7 07/03/2017 08:00 AM         Lab Results   Component Value Date/Time    Sodium 141 07/03/2017 08:00 AM    Potassium 4.7 07/03/2017 08:00 AM    Chloride 104 07/03/2017 08:00 AM    CO2 28 07/03/2017 08:00 AM    Anion gap 9 07/03/2017 08:00 AM    Glucose 91 07/03/2017 08:00 AM    BUN 11 07/03/2017 08:00 AM    Creatinine 0.73 07/03/2017 08:00 AM    BUN/Creatinine ratio 15 07/03/2017 08:00 AM    GFR est AA >60 07/03/2017 08:00 AM    GFR est non-AA >60 07/03/2017 08:00 AM    Calcium 9.3 07/03/2017 08:00 AM         Lab Results   Component Value Date/Time    ALT (SGPT) 91 07/03/2017 08:00 AM    AST (SGOT) 36 07/03/2017 08:00 AM    Alk.  phosphatase 86 07/03/2017 08:00 AM    Bilirubin, direct 0.3 07/03/2017 08:00 AM Bilirubin, total 1.3 07/03/2017 08:00 AM         Lab Results   Component Value Date/Time    Cholesterol, total 220 07/03/2017 08:00 AM    HDL Cholesterol 79 07/03/2017 08:00 AM    LDL, calculated 115.6 07/03/2017 08:00 AM    VLDL, calculated 25.4 07/03/2017 08:00 AM    Triglyceride 127 07/03/2017 08:00 AM    CHOL/HDL Ratio 2.8 07/03/2017 08:00 AM         Lab Results   Component Value Date/Time    TSH 0.42 07/03/2017 08:00 AM    T4, Free 1.5 07/03/2017 08:00 AM         Lab Results   Component Value Date/Time    Vitamin D 25-Hydroxy 41.7 07/03/2017 08:00 AM             ASSESSMENT:   Diagnoses and all orders for this visit:    1. Annual physical exam    2. Essential hypertension    3. Pure hypercholesterolemia    4. Hypothyroidism due to acquired atrophy of thyroid    5. Postmenopausal  -     DEXA BONE DENSITY STUDY AXIAL; Future    6. Breast cancer screening  -     CHRISTIANO MAMMO BI SCREENING INCL CAD; Future    Other orders  -     phentermine (ADIPEX-P) 37.5 mg tablet; Take 1 Tab by mouth every morning. Max Daily Amount: 37.5 mg. She asked whether we could try her again on Phentermine. Advised her to take it daily x 2 weeks then change to every other day so that she could wean her body off it. The hope is that when she completes the regimen, she will not regain the weight. PLAN:   Mammogram: due. Pt will schedule. DEXA: will refer for the first time. Colonoscopy: UTD. pap smear: she will make her own appt with [facility]. F/u 6 mon. The plan was discussed with the patient. The patient verbalized understanding and is in agreement with the plan. All medication potential side effects were discussed with the patient.

## 2017-07-28 NOTE — PATIENT INSTRUCTIONS
Well Visit, Women 48 to 72: Care Instructions  Your Care Instructions  Physical exams can help you stay healthy. Your doctor has checked your overall health and may have suggested ways to take good care of yourself. He or she also may have recommended tests. At home, you can help prevent illness with healthy eating, regular exercise, and other steps. Follow-up care is a key part of your treatment and safety. Be sure to make and go to all appointments, and call your doctor if you are having problems. It's also a good idea to know your test results and keep a list of the medicines you take. How can you care for yourself at home? · Reach and stay at a healthy weight. This will lower your risk for many problems, such as obesity, diabetes, heart disease, and high blood pressure. · Get at least 30 minutes of exercise on most days of the week. Walking is a good choice. You also may want to do other activities, such as running, swimming, cycling, or playing tennis or team sports. · Do not smoke. Smoking can make health problems worse. If you need help quitting, talk to your doctor about stop-smoking programs and medicines. These can increase your chances of quitting for good. · Protect your skin from too much sun. When you're outdoors from 10 a.m. to 4 p.m., stay in the shade or cover up with clothing and a hat with a wide brim. Wear sunglasses that block UV rays. Even when it's cloudy, put broad-spectrum sunscreen (SPF 30 or higher) on any exposed skin. · See a dentist one or two times a year for checkups and to have your teeth cleaned. · Wear a seat belt in the car. · Limit alcohol to 1 drink a day. Too much alcohol can cause health problems. Follow your doctor's advice about when to have certain tests. These tests can spot problems early. · Cholesterol.  Your doctor will tell you how often to have this done based on your age, family history, or other things that can increase your risk for heart attack and stroke. · Blood pressure. Have your blood pressure checked during a routine doctor visit. Your doctor will tell you how often to check your blood pressure based on your age, your blood pressure results, and other factors. · Mammogram. Ask your doctor how often you should have a mammogram, which is an X-ray of your breasts. A mammogram can spot breast cancer before it can be felt and when it is easiest to treat. · Pap test and pelvic exam. Ask your doctor how often you should have a Pap test. You may not need to have a Pap test as often as you used to. · Vision. Have your eyes checked every year or two or as often as your doctor suggests. Some experts recommend that you have yearly exams for glaucoma and other age-related eye problems starting at age 48. · Hearing. Tell your doctor if you notice any change in your hearing. You can have tests to find out how well you hear. · Diabetes. Ask your doctor whether you should have tests for diabetes. · Colon cancer. You should begin tests for colon cancer at age 48. You may have one of several tests. Your doctor will tell you how often to have tests based on your age and risk. Risks include whether you already had a precancerous polyp removed from your colon or whether your parents, sisters and brothers, or children have had colon cancer. · Thyroid disease. Talk to your doctor about whether to have your thyroid checked as part of a regular physical exam. Women have an increased chance of a thyroid problem. · Osteoporosis. You should begin tests for bone density at age 72. If you are younger than 72, ask your doctor whether you have factors that may increase your risk for this disease. You may want to have this test before age 72. · Heart attack and stroke risk. At least every 4 to 6 years, you should have your risk for heart attack and stroke assessed.  Your doctor uses factors such as your age, blood pressure, cholesterol, and whether you smoke or have diabetes to show what your risk for a heart attack or stroke is over the next 10 years. When should you call for help? Watch closely for changes in your health, and be sure to contact your doctor if you have any problems or symptoms that concern you. Where can you learn more? Go to http://nicola-kobe.info/. Enter E272 in the search box to learn more about \"Well Visit, Women 50 to 72: Care Instructions. \"  Current as of: July 19, 2016  Content Version: 11.3  © 0745-2701 [employer]wise[employer]. Care instructions adapted under license by [employer] (which disclaims liability or warranty for this information). If you have questions about a medical condition or this instruction, always ask your healthcare professional. [address] any warranty or liability for your use of this information.

## 2017-07-28 NOTE — PROGRESS NOTES
Olimpia Rios is a 49 y.o. female here today for annual physical         1. Have you been to the ER, urgent care clinic since your last visit? Hospitalized since your last visit? No    2. Have you seen or consulted any other health care providers outside of the [address] [place] [address] since your last visit? Include any pap smears or colon screening.  Yes Where: [facility]

## 2017-08-09 LAB — AMB DEXA, EXTERNAL: NORMAL

## 2017-08-16 RX ORDER — ROSUVASTATIN CALCIUM 10 MG/1
TABLET, COATED ORAL
Qty: 90 TAB | Refills: 2 | Status: SHIPPED | OUTPATIENT
Start: 2017-08-16 | End: 2018-04-21 | Stop reason: SDUPTHER

## 2017-08-22 DIAGNOSIS — Z78.0 POSTMENOPAUSAL: ICD-10-CM

## 2017-09-07 DIAGNOSIS — Z12.39 BREAST CANCER SCREENING: ICD-10-CM

## 2018-03-01 ENCOUNTER — OFFICE VISIT (OUTPATIENT)
Dept: FAMILY MEDICINE CLINIC | Age: 58
End: 2018-03-01

## 2018-03-01 ENCOUNTER — HOSPITAL ENCOUNTER (OUTPATIENT)
Dept: LAB | Age: 58
Discharge: HOME | End: 2018-03-01
Payer: COMMERCIAL

## 2018-03-01 VITALS
OXYGEN SATURATION: 98 % | RESPIRATION RATE: 18 BRPM | HEIGHT: 64 IN | DIASTOLIC BLOOD PRESSURE: 84 MMHG | WEIGHT: 152.8 LBS | BODY MASS INDEX: 26.09 KG/M2 | TEMPERATURE: 97.5 F | SYSTOLIC BLOOD PRESSURE: 132 MMHG | HEART RATE: 78 BPM

## 2018-03-01 DIAGNOSIS — J01.00 ACUTE NON-RECURRENT MAXILLARY SINUSITIS: Primary | ICD-10-CM

## 2018-03-01 DIAGNOSIS — E03.4 HYPOTHYROIDISM DUE TO ACQUIRED ATROPHY OF THYROID: ICD-10-CM

## 2018-03-01 DIAGNOSIS — E83.52 HYPERCALCEMIA: Primary | ICD-10-CM

## 2018-03-01 DIAGNOSIS — I10 ESSENTIAL HYPERTENSION: Chronic | ICD-10-CM

## 2018-03-01 LAB
ANION GAP SERPL CALC-SCNC: 8 MMOL/L (ref 3–18)
BUN SERPL-MCNC: 14 MG/DL (ref 7–18)
BUN/CREAT SERPL: 18 (ref 12–20)
CALCIUM SERPL-MCNC: 10.6 MG/DL (ref 8.5–10.1)
CHLORIDE SERPL-SCNC: 102 MMOL/L (ref 100–108)
CO2 SERPL-SCNC: 30 MMOL/L (ref 21–32)
CREAT SERPL-MCNC: 0.77 MG/DL (ref 0.6–1.3)
GLUCOSE SERPL-MCNC: 95 MG/DL (ref 74–99)
POTASSIUM SERPL-SCNC: 4.4 MMOL/L (ref 3.5–5.5)
SODIUM SERPL-SCNC: 140 MMOL/L (ref 136–145)
T4 FREE SERPL-MCNC: 1.4 NG/DL (ref 0.7–1.5)
TSH SERPL DL<=0.05 MIU/L-ACNC: 1.34 UIU/ML (ref 0.36–3.74)

## 2018-03-01 PROCEDURE — 80048 BASIC METABOLIC PNL TOTAL CA: CPT | Performed by: INTERNAL MEDICINE

## 2018-03-01 PROCEDURE — 84443 ASSAY THYROID STIM HORMONE: CPT | Performed by: INTERNAL MEDICINE

## 2018-03-01 PROCEDURE — 84439 ASSAY OF FREE THYROXINE: CPT | Performed by: INTERNAL MEDICINE

## 2018-03-01 PROCEDURE — 36415 COLL VENOUS BLD VENIPUNCTURE: CPT | Performed by: INTERNAL MEDICINE

## 2018-03-01 RX ORDER — DOXYCYCLINE 100 MG/1
100 TABLET ORAL 2 TIMES DAILY
Qty: 20 TAB | Refills: 0 | Status: SHIPPED | OUTPATIENT
Start: 2018-03-01 | End: 2018-03-11

## 2018-03-01 NOTE — MR AVS SNAPSHOT
[address]  [address] [facility] [address] 
[phone] Patient: Kathryn Brantley MRN: [id number] JIN:[id number] Visit Information Date & Time Provider Department Dept. Phone Encounter #  
 3/1/2018 11:15 AM [place] Bund[place], Aaron Boudreaux [place] [phone] [id number] Upcoming Health Maintenance Date Due Hepatitis C Screening 1960 PAP AKA CERVICAL CYTOLOGY 12/23/2017 BREAST CANCER SCRN MAMMOGRAM 8/9/2019 COLONOSCOPY 5/18/2026 DTaP/Tdap/Td series (2 - Td) 7/27/2026 Allergies as of 3/1/2018  Review Complete On: 3/1/2018 By: Glen Bundy MD  
  
 Severity Noted Reaction Type Reactions Ancef [Cefazolin]  01/16/2010   Intolerance Other (comments)  
 headaches Bactrim [Sulfamethoxazole-trimethoprim]  01/16/2010   Intolerance Other (comments)  
 headache Eryc [Erythromycin]  01/16/2010   Side Effect Rash Lisinopril  04/04/2016    Swelling Pcn [Penicillins]  01/16/2010   Side Effect Unknown (comments) Probiotic [Lactobacillus Acidophilus]  04/04/2016    Angioedema Soybean Oil  07/27/2016    Hives Current Immunizations  Never Reviewed Name Date Tdap 7/27/2016  9:11 AM  
  
 Not reviewed this visit You Were Diagnosed With   
  
 Codes Comments Acute non-recurrent maxillary sinusitis    -  Primary ICD-10-CM: J01.00 ICD-9-CM: 461.0 Essential hypertension     ICD-10-CM: I10 
ICD-9-CM: 401.9 Hypothyroidism due to acquired atrophy of thyroid     ICD-10-CM: E03.4 ICD-9-CM: 244.8, 246.8 Vitals BP Pulse Temp Resp Height(growth percentile) Weight(growth percentile) 132/84 (BP 1 Location: Left arm, BP Patient Position: Sitting) 78 97.5 °F (36.4 °C) (Oral) 18 5' 4\" (1.626 m) 152 lb 12.8 oz (69.3 kg) LMP SpO2 BMI OB Status Smoking Status 10/18/2011 98% 26.23 kg/m2 Postmenopausal Never Smoker Vitals History BMI and BSA Data Body Mass Index Body Surface Area  
 26.23 kg/m 2 1.77 m 2 Preferred Pharmacy Pharmacy Name Phone [id number] [address] [facility], [phone].. [phone] Your Updated Medication List  
  
   
This list is accurate as of 3/1/18 11:41 AM.  Always use your most recent med list.  
  
  
  
  
 COQ10 (UBIQUINOL) PO Take  by mouth daily. desloratadine 5 mg tablet Commonly known as:  CLARINEX Take 1 Tab by mouth daily. doxycycline 100 mg tablet Commonly known as:  ADOXA Take 1 Tab by mouth two (2) times a day for 10 days. escitalopram oxalate 5 mg tablet Commonly known as:  LEXAPRO  
take 1 tablet by mouth once daily  
  
 levothyroxine 88 mcg tablet Commonly known as:  SYNTHROID  
take 1 tablet by mouth once daily before BREAKFAST  
  
 losartan-hydroCHLOROthiazide 50-12.5 mg per tablet Commonly known as:  HYZAAR  
take 1 tablet by mouth once daily  
  
 montelukast 10 mg tablet Commonly known as:  SINGULAIR  
take 1 tablet by mouth once daily  
  
 multivitamin tablet Commonly known as:  ONE A DAY Take 1 tablet by mouth daily. phentermine 37.5 mg tablet Commonly known as:  ADIPEX-P Take 1 Tab by mouth every morning. Max Daily Amount: 37.5 mg.  
  
 rosuvastatin 10 mg tablet Commonly known as:  CRESTOR  
take 1 tablet by mouth at bedtime VITAMIN D3 2,000 unit Tab Generic drug:  cholecalciferol (vitamin D3) Take  by mouth. Prescriptions Sent to Pharmacy Refills  
 doxycycline (ADOXA) 100 mg tablet 0 Sig: Take 1 Tab by mouth two (2) times a day for 10 days. Class: Normal  
 Pharmacy: [id number] [address] [place] [address], [phone] [facility]. Ph #: [phone] Route: Oral  
  
To-Do List   
 03/01/2018 Lab:  METABOLIC PANEL, BASIC   
  
 03/01/2018 Lab:  T4, FREE   
  
 03/01/2018 Lab:  TSH 3RD GENERATION Introducing \[facility]\"" & HEALTH SERVICES! Dear Darrick Course: Thank you for requesting a [employer] account. Our records indicate that you already have an active [employer] account. You can access your account anytime at [email or web address] Did you know that you can access your hospital and ER discharge instructions at any time in [employer]? You can also review all of your test results from your hospital stay or ER visit. Additional Information If you have questions, please visit the Frequently Asked Questions section of the [employer] website at [email or web address] Remember, [employer] is NOT to be used for urgent needs. For medical emergencies, dial 911. Now available from your iPhone and Android! Please provide this summary of care documentation to your next provider. Your primary care clinician is listed as Nichelle 51. If you have any questions after today's visit, please call [phone].

## 2018-03-01 NOTE — PROGRESS NOTES
Abi Laird is a 56 y.o. female is here for a general follow up. 1. Have you been to the ER, urgent care clinic since your last visit? Hospitalized since your last visit? No    2. Have you seen or consulted any other health care providers outside of the [address] [place] [address] since your last visit? Include any pap smears or colon screening. No     Health Maintenance Due   Topic Date Due    Hepatitis C Screening  1960    Influenza Age 5 to Adult  08/01/2017    PAP AKA CERVICAL CYTOLOGY  12/23/2017     Doesn't want flu shot.

## 2018-03-01 NOTE — PROGRESS NOTES
Assessment/Plan:    *Diagnoses and all orders for this visit:    1. Acute non-recurrent maxillary sinusitis  -     doxycycline (ADOXA) 100 mg tablet; Take 1 Tab by mouth two (2) times a day for 10 days. 2. Essential hypertension    3. Hypothyroidism due to acquired atrophy of thyroid  -     TSH 3RD GENERATION; Future  -     T4, FREE; Future  -     METABOLIC PANEL, BASIC; Future        Physical in Aug.  BW prior. Will contact on above labs. The plan was discussed with the patient. The patient verbalized understanding and is in agreement with the plan. All medication potential side effects were discussed with the patient.    -------------------------------------------------------------------------------------------------------------------        Karishma Gomez is a 56 y.o. female and presents with Blood Pressure Check         Subjective:  Hypothyroidism: will repeat labs. Has been working out regularly now. Has lost some weight. HTN: controlled. Having sinus congestion, pressure, thick nasal mucus for over 1 week. Her teeth hurt, not getting better. ROS:  Constitutional: No recent weight change. No weakness/fatigue. No f/c. Skin: No rashes, change in nails/hair, itching   HENT: No HA, dizziness. No hearing loss/tinnitus. No nasal congestion/discharge. Eyes: No change in vision, double/blurred vision or eye pain/redness. Cardiovascular: No CP/palpitations. No HIDALGO/orthopnea/PND. Respiratory: No cough/sputum, dyspnea, wheezing. Gastointestinal: No dysphagia, reflux. No n/v. No constipation/diarrhea. No melena/rectal bleeding. Genitourinary: No dysuria, urinary hesitancy, nocturia, hematuria. No incontinence. Musculoskeletal: No joint pain/stiffness. No muscle pain/tenderness. Endo: No heat/cold intolerance, no polyuria/polydypsia. Heme: No h/o anemia. No easy bleeding/bruising. Allergy/Immunology: No seasonal rhinitis.  Denies frequent colds, sinus/ear infections. Neurological: No seizures/numbness/weakness. No paresthesias. Psychiatric:  No depression, anxiety. The problem list was updated as a part of today's visit. Patient Active Problem List   Diagnosis Code    HTN (hypertension) I10    Hyperlipidemia E78.5    Lupus L93.0    Migraine G43.909    Anxiety F41.9    Endometriosis N80.9    Cervical herniated disc M50.20    Hypothyroidism E03.9       The PSH, FH were reviewed. SH:  Social History   Substance Use Topics    Smoking status: Never Smoker    Smokeless tobacco: Never Used    Alcohol use No       Medications/Allergies:  Current Outpatient Prescriptions on File Prior to Visit   Medication Sig Dispense Refill    levothyroxine (SYNTHROID) 88 mcg tablet take 1 tablet by mouth once daily before BREAKFAST 90 Tab 0    escitalopram oxalate (LEXAPRO) 5 mg tablet take 1 tablet by mouth once daily 90 Tab 0    rosuvastatin (CRESTOR) 10 mg tablet take 1 tablet by mouth at bedtime 90 Tab 2    montelukast (SINGULAIR) 10 mg tablet take 1 tablet by mouth once daily 90 Tab 2    cholecalciferol, vitamin D3, (VITAMIN D3) 2,000 unit tab Take  by mouth.  losartan-hydroCHLOROthiazide (HYZAAR) 50-12.5 mg per tablet take 1 tablet by mouth once daily 90 Tab 0    multivitamin (ONE A DAY) tablet Take 1 tablet by mouth daily.  phentermine (ADIPEX-P) 37.5 mg tablet Take 1 Tab by mouth every morning. Max Daily Amount: 37.5 mg. 30 Tab 1    desloratadine (CLARINEX) 5 mg tablet Take 1 Tab by mouth daily. 90 Tab 2    COQ10, UBIQUINOL, PO Take  by mouth daily. No current facility-administered medications on file prior to visit.          Allergies   Allergen Reactions    Ancef [Cefazolin] Other (comments)     headaches    Bactrim [Sulfamethoxazole-Trimethoprim] Other (comments)     headache    Eryc [Erythromycin] Rash    Lisinopril Swelling    Pcn [Penicillins] Unknown (comments)    Probiotic [Lactobacillus Acidophilus] Angioedema    Soybean Oil Hives         Health Maintenance:   Health Maintenance   Topic Date Due    Hepatitis C Screening  1960    PAP AKA CERVICAL CYTOLOGY  12/23/2017    BREAST CANCER SCRN MAMMOGRAM  08/09/2019    COLONOSCOPY  05/18/2026    DTaP/Tdap/Td series (2 - Td) 07/27/2026    Influenza Age 9 to Adult  Addressed       Objective:  Visit Vitals    /84 (BP 1 Location: Left arm, BP Patient Position: Sitting)    Pulse 78    Temp 97.5 °F (36.4 °C) (Oral)    Resp 18    Ht 5' 4\" (1.626 m)    Wt 152 lb 12.8 oz (69.3 kg)    LMP 10/18/2011    SpO2 98%    BMI 26.23 kg/m2          Nurses notes and VS reviewed. Physical Examination: General appearance - alert, well appearing, and in no distress  Nose - mucosal congestion and sinus tenderness noted maxillary region  Mouth - erythematous  Chest - clear to auscultation, no wheezes, rales or rhonchi, symmetric air entry  Heart - normal rate, regular rhythm, normal S1, S2, no murmurs, rubs, clicks or gallops        Labwork and Ancillary Studies:    CBC w/Diff  Lab Results   Component Value Date/Time    WBC 4.8 07/03/2017 08:00 AM    HGB 13.9 07/03/2017 08:00 AM    PLATELET 026 74/25/8634 08:00 AM         Basic Metabolic Profile  Lab Results   Component Value Date/Time    Sodium 141 07/03/2017 08:00 AM    Potassium 4.7 07/03/2017 08:00 AM    Chloride 104 07/03/2017 08:00 AM    CO2 28 07/03/2017 08:00 AM    Anion gap 9 07/03/2017 08:00 AM    Glucose 91 07/03/2017 08:00 AM    BUN 11 07/03/2017 08:00 AM    Creatinine 0.73 07/03/2017 08:00 AM    BUN/Creatinine ratio 15 07/03/2017 08:00 AM    GFR est AA >60 07/03/2017 08:00 AM    GFR est non-AA >60 07/03/2017 08:00 AM    Calcium 9.3 07/03/2017 08:00 AM         LFT  Lab Results   Component Value Date/Time    ALT (SGPT) 91 (H) 07/03/2017 08:00 AM    AST (SGOT) 36 07/03/2017 08:00 AM    Alk.  phosphatase 86 07/03/2017 08:00 AM    Bilirubin, direct 0.3 (H) 07/03/2017 08:00 AM    Bilirubin, total 1.3 (H) 07/03/2017 08:00 AM         Cholesterol  Lab Results   Component Value Date/Time    Cholesterol, total 220 (H) 07/03/2017 08:00 AM    HDL Cholesterol 79 (H) 07/03/2017 08:00 AM    LDL, calculated 115.6 (H) 07/03/2017 08:00 AM    Triglyceride 127 07/03/2017 08:00 AM    CHOL/HDL Ratio 2.8 07/03/2017 08:00 AM

## 2018-03-02 NOTE — PROGRESS NOTES
Thyroid is fine. Calcium is a little elevated. Would suggest we repeat it again in 1 month. Lab order in chart.

## 2018-05-28 RX ORDER — LOSARTAN POTASSIUM AND HYDROCHLOROTHIAZIDE 12.5; 5 MG/1; MG/1
TABLET ORAL
Qty: 90 TAB | Refills: 0 | Status: SHIPPED | OUTPATIENT
Start: 2018-05-28 | End: 2018-10-14 | Stop reason: SDUPTHER

## 2018-07-05 ENCOUNTER — HOSPITAL ENCOUNTER (OUTPATIENT)
Dept: LAB | Age: 58
Discharge: HOME | End: 2018-07-05
Payer: COMMERCIAL

## 2018-07-05 DIAGNOSIS — E83.52 HYPERCALCEMIA: ICD-10-CM

## 2018-07-05 LAB
CALCIUM SERPL-MCNC: 9.1 MG/DL (ref 8.5–10.1)
PTH-INTACT SERPL-MCNC: 48 PG/ML (ref 18.4–88)

## 2018-07-05 PROCEDURE — 83970 ASSAY OF PARATHORMONE: CPT | Performed by: INTERNAL MEDICINE

## 2018-07-05 PROCEDURE — 82330 ASSAY OF CALCIUM: CPT | Performed by: INTERNAL MEDICINE

## 2018-07-05 PROCEDURE — 36415 COLL VENOUS BLD VENIPUNCTURE: CPT | Performed by: INTERNAL MEDICINE

## 2018-07-06 LAB — CA-I SERPL ISE-MCNC: 5 MG/DL (ref 4.5–5.6)

## 2018-08-22 ENCOUNTER — OFFICE VISIT (OUTPATIENT)
Dept: FAMILY MEDICINE CLINIC | Age: 58
End: 2018-08-22

## 2018-08-22 VITALS
HEART RATE: 62 BPM | BODY MASS INDEX: 26.98 KG/M2 | DIASTOLIC BLOOD PRESSURE: 86 MMHG | SYSTOLIC BLOOD PRESSURE: 130 MMHG | WEIGHT: 158 LBS | TEMPERATURE: 97.6 F | RESPIRATION RATE: 18 BRPM | OXYGEN SATURATION: 98 % | HEIGHT: 64 IN

## 2018-08-22 DIAGNOSIS — J01.00 ACUTE MAXILLARY SINUSITIS, RECURRENCE NOT SPECIFIED: Primary | ICD-10-CM

## 2018-08-22 RX ORDER — DOXYCYCLINE 100 MG/1
CAPSULE ORAL
Qty: 20 CAP | Refills: 0 | Status: SHIPPED | OUTPATIENT
Start: 2018-08-22 | End: 2019-11-12

## 2018-08-22 RX ORDER — METHYLPREDNISOLONE 4 MG/1
TABLET ORAL
Qty: 1 DOSE PACK | Refills: 0 | Status: SHIPPED | OUTPATIENT
Start: 2018-08-22 | End: 2019-11-12

## 2018-08-22 NOTE — PATIENT INSTRUCTIONS
Doxycycline 100 mg, Take 1 capsule twice daily with food for 10 days, remain upright for 45 minutes after each dose. Follow dose pack instructions  Follow up for new symptoms, worsening symptoms or failure to improve. Sinusitis: Care Instructions  Your Care Instructions    Sinusitis is an infection of the lining of the sinus cavities in your head. Sinusitis often follows a cold. It causes pain and pressure in your head and face. In most cases, sinusitis gets better on its own in 1 to 2 weeks. But some mild symptoms may last for several weeks. Sometimes antibiotics are needed. Follow-up care is a key part of your treatment and safety. Be sure to make and go to all appointments, and call your doctor if you are having problems. It's also a good idea to know your test results and keep a list of the medicines you take. How can you care for yourself at home? · Take an over-the-counter pain medicine, such as acetaminophen (Tylenol), ibuprofen (Advil, Motrin), or naproxen (Aleve). Read and follow all instructions on the label. · If the doctor prescribed antibiotics, take them as directed. Do not stop taking them just because you feel better. You need to take the full course of antibiotics. · Be careful when taking over-the-counter cold or flu medicines and Tylenol at the same time. Many of these medicines have acetaminophen, which is Tylenol. Read the labels to make sure that you are not taking more than the recommended dose. Too much acetaminophen (Tylenol) can be harmful. · Breathe warm, moist air from a steamy shower, a hot bath, or a sink filled with hot water. Avoid cold, dry air. Using a humidifier in your home may help. Follow the directions for cleaning the machine. · Use saline (saltwater) nasal washes to help keep your nasal passages open and wash out mucus and bacteria. You can buy saline nose drops at a grocery store or drugstore.  Or you can make your own at home by adding 1 teaspoon of salt and 1 teaspoon of baking soda to 2 cups of distilled water. If you make your own, fill a bulb syringe with the solution, insert the tip into your nostril, and squeeze gently. Mathew Maizes your nose. · Put a hot, wet towel or a warm gel pack on your face 3 or 4 times a day for 5 to 10 minutes each time. · Try a decongestant nasal spray like oxymetazoline (Afrin). Do not use it for more than 3 days in a row. Using it for more than 3 days can make your congestion worse. When should you call for help? Call your doctor now or seek immediate medical care if:    · You have new or worse swelling or redness in your face or around your eyes.     · You have a new or higher fever.    Watch closely for changes in your health, and be sure to contact your doctor if:    · You have new or worse facial pain.     · The mucus from your nose becomes thicker (like pus) or has new blood in it.     · You are not getting better as expected. Where can you learn more? Go to http://nicola-kobe.info/. Enter N001 in the search box to learn more about \"Sinusitis: Care Instructions. \"  Current as of: May 12, 2017  Content Version: 11.7  © 5248-0088 [employer]. Care instructions adapted under license by [employer] (which disclaims liability or warranty for this information). If you have questions about a medical condition or this instruction, always ask your healthcare professional. [place] [address] any warranty or liability for your use of this information.

## 2018-08-22 NOTE — PROGRESS NOTES
HISTORY OF PRESENT ILLNESS  Mariana Santamaria is a 56 y.o. female. Sinus Pain    The history is provided by the patient and medical records. This is a new problem. Episode onset: 2 weeks ago. Associated symptoms include headaches. Pertinent negatives include no chills, no cough and no shortness of breath. Patient Active Problem List   Diagnosis Code    HTN (hypertension) I10    Hyperlipidemia E78.5    Lupus L93.0    Migraine G43.909    Anxiety F41.9    Endometriosis N80.9    Cervical herniated disc M50.20    Hypothyroidism E03.9       Current Outpatient Prescriptions:     losartan-hydroCHLOROthiazide (HYZAAR) 50-12.5 mg per tablet, take 1 tablet by mouth once daily, Disp: 90 Tab, Rfl: 0    montelukast (SINGULAIR) 10 mg tablet, take 1 tablet by mouth once daily, Disp: 90 Tab, Rfl: 1    rosuvastatin (CRESTOR) 10 mg tablet, take 1 tablet by mouth at bedtime, Disp: 90 Tab, Rfl: 1    levothyroxine (SYNTHROID) 88 mcg tablet, take 1 tablet by mouth daily before BREAKFAST, Disp: 90 Tab, Rfl: 1    escitalopram oxalate (LEXAPRO) 5 mg tablet, take 1 tablet by mouth once daily, Disp: 90 Tab, Rfl: 0    cholecalciferol, vitamin D3, (VITAMIN D3) 2,000 unit tab, Take  by mouth., Disp: , Rfl:     multivitamin (ONE A DAY) tablet, Take 1 tablet by mouth daily. , Disp: , Rfl:     Allergies   Allergen Reactions    Ancef [Cefazolin] Other (comments)     headaches    Bactrim [Sulfamethoxazole-Trimethoprim] Other (comments)     headache    Eryc [Erythromycin] Rash    Lisinopril Swelling    Pcn [Penicillins] Unknown (comments)    Probiotic [Lactobacillus Acidophilus] Angioedema    Soybean Oil Hives         Review of Systems   Constitutional: Negative for chills and fever. HENT: Positive for sinus pain and tinnitus (bilateral x 2 days). Maxillary sinus pressure   Respiratory: Negative for cough, shortness of breath and wheezing.          Chest feels \"a little heavy\"   Gastrointestinal: Negative for diarrhea, nausea and vomiting. Neurological: Positive for headaches. Visit Vitals    /86 (BP 1 Location: Right arm, BP Patient Position: Sitting)    Pulse 62    Temp 97.6 °F (36.4 °C) (Oral)    Resp 18    Ht 5' 4\" (1.626 m)    Wt 158 lb (71.7 kg)    LMP 10/18/2011    SpO2 98%    BMI 27.12 kg/m2       Physical Exam   Constitutional: She is oriented to person, place, and time. She appears well-developed and well-nourished. HENT:   Head: Normocephalic. Right Ear: Tympanic membrane and ear canal normal.   Left Ear: Tympanic membrane and ear canal normal.   Nose: Right sinus exhibits maxillary sinus tenderness. Right sinus exhibits no frontal sinus tenderness. Left sinus exhibits maxillary sinus tenderness. Left sinus exhibits no frontal sinus tenderness. Mouth/Throat: Oropharynx is clear and moist.   Eyes: Conjunctivae and EOM are normal.   Neck: Neck supple. Cardiovascular: Normal rate, regular rhythm and normal heart sounds. Pulmonary/Chest: Effort normal and breath sounds normal.   Lymphadenopathy:     She has no cervical adenopathy. Neurological: She is alert and oriented to person, place, and time. Skin: Skin is warm and dry. Psychiatric: She has a normal mood and affect. Her behavior is normal.   Nursing note and vitals reviewed. ASSESSMENT and PLAN    ICD-10-CM ICD-9-CM    1. Acute maxillary sinusitis, recurrence not specified J01.00 461.0 methylPREDNISolone (MEDROL DOSEPACK) 4 mg tablet      doxycycline (VIBRAMYCIN) 100 mg capsule   Doxycycline 100 mg, Take 1 capsule twice daily with food for 10 days, remain upright for 45 minutes after each dose. Follow dose pack instructions  Follow up for new symptoms, worsening symptoms or failure to improve.

## 2018-08-22 NOTE — MR AVS SNAPSHOT
[address] [facility] 
 
 
 [address] [facility] [address] 
[phone] Patient: Donte Watt MRN: [id number] EGI:1/2/3550 Visit Information Date & Time Provider Department Dept. Phone Encounter #  
 8/22/2018  1:45 PM Kaela Prado, 3 [facility] [phone] [id number] Your Appointments 9/6/2018  8:00 AM  
PHYSICAL with Flaco Gunderson MD  
3 [address]) Appt Note: Physical; R/S FOR LATER DATE 7/27/18 RM  
 [address] [facility] [address] [facility] [place] [facility] [address] [phone]  
  
   
 [address] Upcoming Health Maintenance Date Due Hepatitis C Screening 1960 PAP AKA CERVICAL CYTOLOGY 12/23/2017 Influenza Age 5 to Adult 8/1/2018 BREAST CANCER SCRN MAMMOGRAM 8/9/2019 COLONOSCOPY 5/18/2026 DTaP/Tdap/Td series (2 - Td) 7/27/2026 Allergies as of 8/22/2018  Review Complete On: 8/22/2018 By: Kaela Prado MD  
  
 Severity Noted Reaction Type Reactions Ancef [Cefazolin]  01/16/2010   Intolerance Other (comments)  
 headaches Bactrim [Sulfamethoxazole-trimethoprim]  01/16/2010   Intolerance Other (comments)  
 headache Eryc [Erythromycin]  01/16/2010   Side Effect Rash Lisinopril  04/04/2016    Swelling Pcn [Penicillins]  01/16/2010   Side Effect Unknown (comments) Probiotic [Lactobacillus Acidophilus]  04/04/2016    Angioedema Soybean Oil  07/27/2016    Hives Current Immunizations  Never Reviewed Name Date Tdap 7/27/2016  9:11 AM  
  
 Not reviewed this visit You Were Diagnosed With   
  
 Codes Comments Acute maxillary sinusitis, recurrence not specified    -  Primary ICD-10-CM: J01.00 ICD-9-CM: 461.0 Vitals BP Pulse Temp Resp Height(growth percentile) Weight(growth percentile)  130/86 (BP 1 Location: Right arm, BP Patient Position: Sitting) 62 97.6 °F (36.4 °C) (Oral) 18 5' 4\" (1.626 m) 158 lb (71.7 kg) LMP SpO2 BMI OB Status Smoking Status 10/18/2011 98% 27.12 kg/m2 Postmenopausal Never Smoker Vitals History BMI and BSA Data Body Mass Index Body Surface Area  
 27.12 kg/m 2 1.8 m 2 Preferred Pharmacy Pharmacy Name Phone [id number] [address] [facility], [phone] [facility].. [phone] Your Updated Medication List  
  
   
This list is accurate as of 8/22/18  2:04 PM.  Always use your most recent med list.  
  
  
  
  
 doxycycline 100 mg capsule Commonly known as:  VIBRAMYCIN Take 1 capsule twice daily with food for 10 days, remain upright for 45 minutes after each dose. escitalopram oxalate 5 mg tablet Commonly known as:  LEXAPRO  
take 1 tablet by mouth once daily  
  
 levothyroxine 88 mcg tablet Commonly known as:  SYNTHROID  
take 1 tablet by mouth daily before BREAKFAST  
  
 losartan-hydroCHLOROthiazide 50-12.5 mg per tablet Commonly known as:  HYZAAR  
take 1 tablet by mouth once daily  
  
 methylPREDNISolone 4 mg tablet Commonly known as:  Elner Setting Follow dose pack instructions  
  
 montelukast 10 mg tablet Commonly known as:  SINGULAIR  
take 1 tablet by mouth once daily  
  
 multivitamin tablet Commonly known as:  ONE A DAY Take 1 tablet by mouth daily. rosuvastatin 10 mg tablet Commonly known as:  CRESTOR  
take 1 tablet by mouth at bedtime VITAMIN D3 2,000 unit Tab Generic drug:  cholecalciferol (vitamin D3) Take  by mouth. Prescriptions Sent to Pharmacy Refills  
 methylPREDNISolone (MEDROL DOSEPACK) 4 mg tablet 0 Sig: Follow dose pack instructions Class: Normal  
 Pharmacy: [id number] [address] [place] [address], [phone] [facility].. Ph #: [phone]  
 doxycycline (VIBRAMYCIN) 100 mg capsule 0 Sig: Take 1 capsule twice daily with food for 10 days, remain upright for 45 minutes after each dose. Class: Normal  
 Pharmacy: [id number] [address] [facility], [phone].  #: [phone] Patient Instructions Doxycycline 100 mg, Take 1 capsule twice daily with food for 10 days, remain upright for 45 minutes after each dose. Follow dose pack instructions Follow up for new symptoms, worsening symptoms or failure to improve. Sinusitis: Care Instructions Your Care Instructions Sinusitis is an infection of the lining of the sinus cavities in your head. Sinusitis often follows a cold. It causes pain and pressure in your head and face. In most cases, sinusitis gets better on its own in 1 to 2 weeks. But some mild symptoms may last for several weeks. Sometimes antibiotics are needed. Follow-up care is a key part of your treatment and safety. Be sure to make and go to all appointments, and call your doctor if you are having problems. It's also a good idea to know your test results and keep a list of the medicines you take. How can you care for yourself at home? · Take an over-the-counter pain medicine, such as acetaminophen (Tylenol), ibuprofen (Advil, Motrin), or naproxen (Aleve). Read and follow all instructions on the label. · If the doctor prescribed antibiotics, take them as directed. Do not stop taking them just because you feel better. You need to take the full course of antibiotics. · Be careful when taking over-the-counter cold or flu medicines and Tylenol at the same time. Many of these medicines have acetaminophen, which is Tylenol. Read the labels to make sure that you are not taking more than the recommended dose. Too much acetaminophen (Tylenol) can be harmful. · Breathe warm, moist air from a steamy shower, a hot bath, or a sink filled with hot water. Avoid cold, dry air. Using a humidifier in your home may help. Follow the directions for cleaning the machine.  
· Use saline (saltwater) nasal washes to help keep your nasal passages open and wash out mucus and bacteria. You can buy saline nose drops at a grocery store or drugstore. Or you can make your own at home by adding 1 teaspoon of salt and 1 teaspoon of baking soda to 2 cups of distilled water. If you make your own, fill a bulb syringe with the solution, insert the tip into your nostril, and squeeze gently. Migdalia Ply your nose. · Put a hot, wet towel or a warm gel pack on your face 3 or 4 times a day for 5 to 10 minutes each time. · Try a decongestant nasal spray like oxymetazoline (Afrin). Do not use it for more than 3 days in a row. Using it for more than 3 days can make your congestion worse. When should you call for help? Call your doctor now or seek immediate medical care if: 
  · You have new or worse swelling or redness in your face or around your eyes.  
  · You have a new or higher fever.  
 Watch closely for changes in your health, and be sure to contact your doctor if: 
  · You have new or worse facial pain.  
  · The mucus from your nose becomes thicker (like pus) or has new blood in it.  
  · You are not getting better as expected. Where can you learn more? Go to http://nicola-kobe.info/. Enter R125 in the search box to learn more about \"Sinusitis: Care Instructions. \" Current as of: May 12, 2017 Content Version: 11.7 © 2895-1550 [employer]. Care instructions adapted under license by [employer] (which disclaims liability or warranty for this information). If you have questions about a medical condition or this instruction, always ask your healthcare professional. [place] [address] any warranty or liability for your use of this information. Introducing [facility] [place] [facility]! Dear Familia Monterroso: Thank you for requesting a [employer] account. Our records indicate that you already have an active [employer] account. You can access your account anytime at [email or web address] Did you know that you can access your hospital and ER discharge instructions at any time in [employer]? You can also review all of your test results from your hospital stay or ER visit. Additional Information If you have questions, please visit the Frequently Asked Questions section of the [employer] website at [email or web address] Remember, [employer] is NOT to be used for urgent needs. For medical emergencies, dial 911. Now available from your iPhone and Android! Please provide this summary of care documentation to your next provider. Your primary care clinician is listed as Nichelle 51. If you have any questions after today's visit, please call [phone].

## 2018-08-22 NOTE — PROGRESS NOTES
Mary Rivera is a 56 y.o. female here for sinus pain. Mary Rivera is a 56 y.o. female (: 1960) presenting to address:    Chief Complaint   Patient presents with    Sinus Pain       Vitals:    18 1348   BP: 130/86   Pulse: 62   Temp: 97.6 °F (36.4 °C)   TempSrc: Oral   SpO2: 98%   Weight: 158 lb (71.7 kg)   Height: 5' 4\" (1.626 m)   PainSc:   5   LMP: 10/18/2011       Hearing/Vision:   No exam data present    Learning Assessment:     Learning Assessment 2015   PRIMARY LEARNER Patient   HIGHEST LEVEL OF EDUCATION - PRIMARY LEARNER  -   BARRIERS PRIMARY LEARNER -   CO-LEARNER CAREGIVER -   PRIMARY LANGUAGE ENGLISH   LEARNER PREFERENCE PRIMARY LISTENING   ANSWERED BY patient   RELATIONSHIP SELF     Depression Screening:     PHQ over the last two weeks 2017   Little interest or pleasure in doing things -   Feeling down, depressed, irritable, or hopeless Several days   Total Score PHQ 2 -     Fall Risk Assessment:   No flowsheet data found. Abuse Screening:     Abuse Screening Questionnaire 2015   Do you ever feel afraid of your partner? N   Are you in a relationship with someone who physically or mentally threatens you? N   Is it safe for you to go home? Y     Coordination of Care Questionaire:   1. Have you been to the ER, urgent care clinic since your last visit? Hospitalized since your last visit? NO    2. Have you seen or consulted any other health care providers outside of the [facility] since your last visit? Include any pap smears or colon screening. NO    Advanced Directive:   1. Do you have an Advanced Directive? NO    2. Would you like information on Advanced Directives?  NO

## 2018-09-06 ENCOUNTER — OFFICE VISIT (OUTPATIENT)
Dept: FAMILY MEDICINE CLINIC | Age: 58
End: 2018-09-06

## 2018-09-06 VITALS
WEIGHT: 158.4 LBS | DIASTOLIC BLOOD PRESSURE: 82 MMHG | OXYGEN SATURATION: 98 % | HEIGHT: 65 IN | TEMPERATURE: 97.6 F | SYSTOLIC BLOOD PRESSURE: 130 MMHG | HEART RATE: 78 BPM | BODY MASS INDEX: 26.39 KG/M2 | RESPIRATION RATE: 12 BRPM

## 2018-09-06 DIAGNOSIS — Z11.59 NEED FOR HEPATITIS C SCREENING TEST: ICD-10-CM

## 2018-09-06 DIAGNOSIS — R09.81 SINUS CONGESTION: ICD-10-CM

## 2018-09-06 DIAGNOSIS — R51.9 SINUS HEADACHE: ICD-10-CM

## 2018-09-06 DIAGNOSIS — R00.2 PALPITATIONS: ICD-10-CM

## 2018-09-06 DIAGNOSIS — F32.0 MILD SINGLE CURRENT EPISODE OF MAJOR DEPRESSIVE DISORDER (HCC): ICD-10-CM

## 2018-09-06 DIAGNOSIS — Z12.39 BREAST CANCER SCREENING: ICD-10-CM

## 2018-09-06 DIAGNOSIS — I10 ESSENTIAL HYPERTENSION: Chronic | ICD-10-CM

## 2018-09-06 DIAGNOSIS — E78.00 PURE HYPERCHOLESTEROLEMIA: Chronic | ICD-10-CM

## 2018-09-06 DIAGNOSIS — H93.19 TINNITUS, UNSPECIFIED LATERALITY: ICD-10-CM

## 2018-09-06 DIAGNOSIS — E03.4 HYPOTHYROIDISM DUE TO ACQUIRED ATROPHY OF THYROID: ICD-10-CM

## 2018-09-06 DIAGNOSIS — Z00.00 ANNUAL PHYSICAL EXAM: Primary | ICD-10-CM

## 2018-09-06 PROBLEM — J01.00 ACUTE MAXILLARY SINUSITIS: Status: RESOLVED | Noted: 2018-08-22 | Resolved: 2018-09-06

## 2018-09-06 PROBLEM — Z28.21 REFUSED INFLUENZA VACCINE: Status: ACTIVE | Noted: 2018-09-06

## 2018-09-06 RX ORDER — ESCITALOPRAM OXALATE 5 MG/1
TABLET ORAL
Qty: 90 TAB | Refills: 2 | Status: SHIPPED | OUTPATIENT
Start: 2018-09-06 | End: 2019-11-12 | Stop reason: SDUPTHER

## 2018-09-06 NOTE — PATIENT INSTRUCTIONS
Well Visit, Women 48 to 72: Care Instructions Your Care Instructions Physical exams can help you stay healthy. Your doctor has checked your overall health and may have suggested ways to take good care of yourself. He or she also may have recommended tests. At home, you can help prevent illness with healthy eating, regular exercise, and other steps. Follow-up care is a key part of your treatment and safety. Be sure to make and go to all appointments, and call your doctor if you are having problems. It's also a good idea to know your test results and keep a list of the medicines you take. How can you care for yourself at home? · Reach and stay at a healthy weight. This will lower your risk for many problems, such as obesity, diabetes, heart disease, and high blood pressure. · Get at least 30 minutes of exercise on most days of the week. Walking is a good choice. You also may want to do other activities, such as running, swimming, cycling, or playing tennis or team sports. · Do not smoke. Smoking can make health problems worse. If you need help quitting, talk to your doctor about stop-smoking programs and medicines. These can increase your chances of quitting for good. · Protect your skin from too much sun. When you're outdoors from 10 a.m. to 4 p.m., stay in the shade or cover up with clothing and a hat with a wide brim. Wear sunglasses that block UV rays. Even when it's cloudy, put broad-spectrum sunscreen (SPF 30 or higher) on any exposed skin. · See a dentist one or two times a year for checkups and to have your teeth cleaned. · Wear a seat belt in the car. · Limit alcohol to 1 drink a day. Too much alcohol can cause health problems. Follow your doctor's advice about when to have certain tests. These tests can spot problems early. · Cholesterol.  Your doctor will tell you how often to have this done based on your age, family history, or other things that can increase your risk for heart attack and stroke. · Blood pressure. Have your blood pressure checked during a routine doctor visit. Your doctor will tell you how often to check your blood pressure based on your age, your blood pressure results, and other factors. · Mammogram. Ask your doctor how often you should have a mammogram, which is an X-ray of your breasts. A mammogram can spot breast cancer before it can be felt and when it is easiest to treat. · Pap test and pelvic exam. Ask your doctor how often you should have a Pap test. You may not need to have a Pap test as often as you used to. · Vision. Have your eyes checked every year or two or as often as your doctor suggests. Some experts recommend that you have yearly exams for glaucoma and other age-related eye problems starting at age 48. · Hearing. Tell your doctor if you notice any change in your hearing. You can have tests to find out how well you hear. · Diabetes. Ask your doctor whether you should have tests for diabetes. · Colon cancer. You should begin tests for colon cancer at age 48. You may have one of several tests. Your doctor will tell you how often to have tests based on your age and risk. Risks include whether you already had a precancerous polyp removed from your colon or whether your parents, sisters and brothers, or children have had colon cancer. · Thyroid disease. Talk to your doctor about whether to have your thyroid checked as part of a regular physical exam. Women have an increased chance of a thyroid problem. · Osteoporosis. You should begin tests for bone density at age 72. If you are younger than 72, ask your doctor whether you have factors that may increase your risk for this disease. You may want to have this test before age 72. · Heart attack and stroke risk. At least every 4 to 6 years, you should have your risk for heart attack and stroke assessed.  Your doctor uses factors such as your age, blood pressure, cholesterol, and whether you smoke or have diabetes to show what your risk for a heart attack or stroke is over the next 10 years. When should you call for help? Watch closely for changes in your health, and be sure to contact your doctor if you have any problems or symptoms that concern you. Where can you learn more? Go to http://nicola-kobe.info/. Enter R083 in the search box to learn more about \"Well Visit, Women 50 to 72: Care Instructions. \" Current as of: May 16, 2017 Content Version: 11.7 © 9997-1439 [employer]. Care instructions adapted under license by [employer] (which disclaims liability or warranty for this information). If you have questions about a medical condition or this instruction, always ask your healthcare professional. [address] any warranty or liability for your use of this information.

## 2018-09-06 NOTE — PROGRESS NOTES
SUBJECTIVE:  
56 y.o. female for annual routine checkup. Has had persistent tinnitus and sinus congestion since her visit w/ Dr. Carolina Hicks. Completed the oral steroids and Doxy but no improvement. Has had intermittent palpitations. Admits that she has been under a lot of stress and not sleeping well. Current Outpatient Prescriptions Medication Sig Dispense Refill  escitalopram oxalate (LEXAPRO) 5 mg tablet take 1 tablet by mouth once daily 90 Tab 2  
 losartan-hydroCHLOROthiazide (HYZAAR) 50-12.5 mg per tablet take 1 tablet by mouth once daily 90 Tab 0  
 montelukast (SINGULAIR) 10 mg tablet take 1 tablet by mouth once daily 90 Tab 1  
 rosuvastatin (CRESTOR) 10 mg tablet take 1 tablet by mouth at bedtime 90 Tab 1  
 levothyroxine (SYNTHROID) 88 mcg tablet take 1 tablet by mouth daily before BREAKFAST 90 Tab 1  cholecalciferol, vitamin D3, (VITAMIN D3) 2,000 unit tab Take  by mouth.  multivitamin (ONE A DAY) tablet Take 1 tablet by mouth daily.  methylPREDNISolone (MEDROL DOSEPACK) 4 mg tablet Follow dose pack instructions 1 Dose Pack 0  
 doxycycline (VIBRAMYCIN) 100 mg capsule Take 1 capsule twice daily with food for 10 days, remain upright for 45 minutes after each dose. 20 Cap 0 Past Medical History:  
Diagnosis Date  Anxiety 1/16/2010  Cervical herniated disc 4/28/2014 Dr. Nereyda Mao ([address] [place] [address]).  Endometriosis 10/28/2011  Fatty liver 7/28/2017  
 HTN (hypertension) 1/16/2010  Hypercholesterolemia  Hyperlipidemia 1/16/2010  Hypothyroidism 1/22/2015  Lupus 1/16/2010  Migraine 1/16/2010  Sinusitis Allergies: Ancef [cefazolin]; Bactrim [sulfamethoxazole-trimethoprim]; Eryc [erythromycin]; Lisinopril; Pcn [penicillins]; Probiotic [lactobacillus acidophilus]; and Soybean oil Patient's last menstrual period was 10/18/2011. ROS:  Feeling well. No dyspnea or chest pain on exertion.   No abdominal pain, change in bowel habits, black or bloody stools. No urinary tract symptoms. GYN ROS: no breast pain or new or enlarging lumps on self exam. No neurological complaints. OBJECTIVE: The patient appears well, alert, oriented x 3, in no distress. Visit Vitals  /82  Pulse 78  Temp 97.6 °F (36.4 °C) (Oral)  Resp 12  Ht 5' 4.57\" (1.64 m)  Wt 158 lb 6.4 oz (71.8 kg)  LMP 10/18/2011  SpO2 98%  BMI 26.71 kg/m2 General appearance: alert, cooperative, no distress, appears stated age Head: Normocephalic, without obvious abnormality, atraumatic Ears: normal TM's and external ear canals AU Throat: Lips, mucosa, and tongue normal. Teeth and gums normal 
Neck: supple, symmetrical, trachea midline, no adenopathy, thyroid: not enlarged, symmetric, no tenderness/mass/nodules, no carotid bruit and no JVD Back: symmetric, no curvature. ROM normal. No CVA tenderness. Lungs: clear to auscultation bilaterally Breasts: patient declines to have breast exam. 
Heart: regular rate and rhythm, S1, S2 normal, no murmur, click, rub or gallop Abdomen: soft, non-tender. Bowel sounds normal. No masses,  no organomegaly Extremities: extremities normal, atraumatic, no cyanosis or edema Pulses: 2+ and symmetric Skin: Skin color, texture, turgor normal. No rashes or lesions Neurological is normal, no focal findings. Lab Results Component Value Date/Time WBC 4.8 07/03/2017 08:00 AM  
 HGB 13.9 07/03/2017 08:00 AM  
 HCT 42.9 07/03/2017 08:00 AM  
 PLATELET 218 67/53/6277 08:00 AM  
 MCV 93.7 07/03/2017 08:00 AM  
 
 
 
Lab Results Component Value Date/Time  Sodium 140 03/01/2018 11:46 AM  
 Potassium 4.4 03/01/2018 11:46 AM  
 Chloride 102 03/01/2018 11:46 AM  
 CO2 30 03/01/2018 11:46 AM  
 Anion gap 8 03/01/2018 11:46 AM  
 Glucose 95 03/01/2018 11:46 AM  
 BUN 14 03/01/2018 11:46 AM  
 Creatinine 0.77 03/01/2018 11:46 AM  
 BUN/Creatinine ratio 18 03/01/2018 11:46 AM  
 GFR est AA >60 03/01/2018 11:46 AM  
 GFR est non-AA >60 03/01/2018 11:46 AM  
 Calcium 9.1 07/05/2018 10:13 AM  
 
 
 
Lab Results Component Value Date/Time ALT (SGPT) 91 (H) 07/03/2017 08:00 AM  
 AST (SGOT) 36 07/03/2017 08:00 AM  
 Alk. phosphatase 86 07/03/2017 08:00 AM  
 Bilirubin, direct 0.3 (H) 07/03/2017 08:00 AM  
 Bilirubin, total 1.3 (H) 07/03/2017 08:00 AM  
 
 
 
Lab Results Component Value Date/Time Cholesterol, total 220 (H) 07/03/2017 08:00 AM  
 HDL Cholesterol 79 (H) 07/03/2017 08:00 AM  
 LDL, calculated 115.6 (H) 07/03/2017 08:00 AM  
 VLDL, calculated 25.4 07/03/2017 08:00 AM  
 Triglyceride 127 07/03/2017 08:00 AM  
 CHOL/HDL Ratio 2.8 07/03/2017 08:00 AM  
 
 
 
Lab Results Component Value Date/Time TSH 1.34 03/01/2018 11:46 AM  
 T4, Free 1.4 03/01/2018 11:46 AM  
 
 
 
Lab Results Component Value Date/Time Vitamin D 25-Hydroxy 41.7 07/03/2017 08:00 AM  
   
 
 
 
ASSESSMENT:  
Diagnoses and all orders for this visit: 
 
1. Annual physical exam 
-     CBC WITH AUTOMATED DIFF; Future 
-     HEPATIC FUNCTION PANEL; Future -     LIPID PANEL; Future -     METABOLIC PANEL, BASIC; Future 
-     TSH 3RD GENERATION; Future -     T4, FREE; Future -     URINALYSIS W/ RFLX MICROSCOPIC; Future -     VITAMIN D, 25 HYDROXY; Future 2. Mild single current episode of major depressive disorder (HCC) 
-     escitalopram oxalate (LEXAPRO) 5 mg tablet; take 1 tablet by mouth once daily 3. Essential hypertension 4. Pure hypercholesterolemia 5. Hypothyroidism due to acquired atrophy of thyroid 6. Breast cancer screening -     CHRISTIANO MAMMO BI SCREENING INCL CAD; Future 7. Tinnitus, unspecified laterality 
-     REFERRAL TO ENT-OTOLARYNGOLOGY 8. Need for hepatitis C screening test 
-     HEPATITIS C AB; Future 9. Sinus congestion 
-     REFERRAL TO ENT-OTOLARYNGOLOGY 10. Sinus headache 
-     REFERRAL TO ENT-OTOLARYNGOLOGY 11. Palpitations -     AMB POC EKG ROUTINE W/ 12 LEADS, INTER & REP 
 
 
 
EKG: NSR, no changes. PLAN:  
Mammogram: has appt in a week. Dexa: done last year, osteopenia. Colonoscopy: UTD. pap smear: she was due last year and never went. She will schedule now with [facility]. Palpitations likely from stress. She will monitor and notify me if things get worse. F/u 4 months to see how things went with ENT and see how she is feeling overall. The plan was discussed with the patient. The patient verbalized understanding and is in agreement with the plan. All medication potential side effects were discussed with the patient.

## 2018-09-06 NOTE — PROGRESS NOTES
Jennifer De La Cruz is a 56 y.o. female presents in office to Chief Complaint Patient presents with  Physical  
  Pt still complains of bilateral tinnitus, dizziness, fatigue, chills, nausea and tingling in bilateral legs for the past three weeks Health Maintenance Due Topic Date Due  
 Hepatitis C Screening  1960  PAP AKA CERVICAL CYTOLOGY  12/23/2017  Influenza Age 5 to Adult  08/01/2018 1. Have you been to the ER, urgent care clinic since your last visit? Hospitalized since your last visit? No 
 
2. Have you seen or consulted any other health care providers outside of the [address] [place] [address] since your last visit? Include any pap smears or colon screening. No 
 
Do you have an [address] [place] [address]. [facility]? No and received information. Visit Vitals  /82  Pulse 78  Temp 97.6 °F (36.4 °C) (Oral)  Resp 12  Ht 5' 4.57\" (1.64 m)  Wt 158 lb 6.4 oz (71.8 kg)  SpO2 98%  BMI 26.71 kg/m2 Pt declined flu vaccine.

## 2018-09-06 NOTE — MR AVS SNAPSHOT
[address] [facility] [address] [phone] Patient: Misty Serrano MRN: [id number] CGX:3/1/[id number] Visit Information Date & Time Provider Department Dept. Phone Encounter #  
 9/6/2018  8:00 AM Noemy Poon MD [employer] [phone] [id number] Upcoming Health Maintenance Date Due Hepatitis C Screening 1960 PAP AKA CERVICAL CYTOLOGY 12/23/2017 Influenza Age 5 to Adult 9/21/2022* BREAST CANCER SCRN MAMMOGRAM 8/9/2019 COLONOSCOPY 5/18/2026 DTaP/Tdap/Td series (2 - Td) 7/27/2026 *Topic was postponed. The date shown is not the original due date. Allergies as of 9/6/2018  Review Complete On: 9/6/2018 By: Noemy Poon MD  
  
 Severity Noted Reaction Type Reactions Ancef [Cefazolin]  01/16/2010   Intolerance Other (comments)  
 headaches Bactrim [Sulfamethoxazole-trimethoprim]  01/16/2010   Intolerance Other (comments)  
 headache Eryc [Erythromycin]  01/16/2010   Side Effect Rash Lisinopril  04/04/2016    Swelling Pcn [Penicillins]  01/16/2010   Side Effect Unknown (comments) Probiotic [Lactobacillus Acidophilus]  04/04/2016    Angioedema Soybean Oil  07/27/2016    Hives Current Immunizations  Never Reviewed Name Date Tdap 7/27/2016  9:11 AM  
  
 Not reviewed this visit You Were Diagnosed With   
  
 Codes Comments Annual physical exam    -  Primary ICD-10-CM: Z00.00 ICD-9-CM: V70.0 Mild single current episode of major depressive disorder (HCC)     ICD-10-CM: F32.0 ICD-9-CM: 296.21 Essential hypertension     ICD-10-CM: I10 
ICD-9-CM: 401.9 Pure hypercholesterolemia     ICD-10-CM: E78.00 ICD-9-CM: 272.0 Hypothyroidism due to acquired atrophy of thyroid     ICD-10-CM: E03.4 ICD-9-CM: 244.8, 246.8 Breast cancer screening     ICD-10-CM: Z12.31 
ICD-9-CM: V76.10 Tinnitus, unspecified laterality     ICD-10-CM: H93.19 ICD-9-CM: 388.30 Need for hepatitis C screening test     ICD-10-CM: Z11.59 
ICD-9-CM: V73.89 Sinus congestion     ICD-10-CM: R09.81 ICD-9-CM: 478.19 Sinus headache     ICD-10-CM: R51 ICD-9-CM: 784.0 Palpitations     ICD-10-CM: R00.2 ICD-9-CM: 785.1 Vitals BP Pulse Temp Resp Height(growth percentile) Weight(growth percentile) 130/82 78 97.6 °F (36.4 °C) (Oral) 12 5' 4.57\" (1.64 m) 158 lb 6.4 oz (71.8 kg) LMP SpO2 BMI OB Status Smoking Status 10/18/2011 98% 26.71 kg/m2 Postmenopausal Never Smoker BMI and BSA Data Body Mass Index Body Surface Area  
 26.71 kg/m 2 1.81 m 2 Preferred Pharmacy Pharmacy Name Phone [id number] [address] [place] [address], [address] [facility] [address] [phone] Your Updated Medication List  
  
   
This list is accurate as of 9/6/18  8:46 AM.  Always use your most recent med list.  
  
  
  
  
 doxycycline 100 mg capsule Commonly known as:  VIBRAMYCIN Take 1 capsule twice daily with food for 10 days, remain upright for 45 minutes after each dose. escitalopram oxalate 5 mg tablet Commonly known as:  LEXAPRO  
take 1 tablet by mouth once daily  
  
 levothyroxine 88 mcg tablet Commonly known as:  SYNTHROID  
take 1 tablet by mouth daily before BREAKFAST  
  
 losartan-hydroCHLOROthiazide 50-12.5 mg per tablet Commonly known as:  HYZAAR  
take 1 tablet by mouth once daily  
  
 methylPREDNISolone 4 mg tablet Commonly known as:  Bosie Pippins Follow dose pack instructions  
  
 montelukast 10 mg tablet Commonly known as:  SINGULAIR  
take 1 tablet by mouth once daily  
  
 multivitamin tablet Commonly known as:  ONE A DAY Take 1 tablet by mouth daily. rosuvastatin 10 mg tablet Commonly known as:  CRESTOR  
take 1 tablet by mouth at bedtime VITAMIN D3 2,000 unit Tab Generic drug:  cholecalciferol (vitamin D3) Take  by mouth. Prescriptions Sent to Pharmacy Refills  
 escitalopram oxalate (LEXAPRO) 5 mg tablet 2 Sig: take 1 tablet by mouth once daily Class: Normal  
 Pharmacy: [id number] [address] [place] [address], [address]  #: [phone] We Performed the Following AMB POC EKG ROUTINE W/ 12 LEADS, INTER & REP [71609 CPT(R)] [facility]] Comments: This external order was created through the Results Console. REFERRAL TO ENT-OTOLARYNGOLOGY [YIE17 Custom] Comments:  
 Please evaluate patient for tinnitus, sinus congestion. To-Do List   
 09/06/2018 Lab:  CBC WITH AUTOMATED DIFF   
  
 09/06/2018 Lab:  HEPATIC FUNCTION PANEL   
  
 09/06/2018 Lab:  HEPATITIS C AB   
  
 09/06/2018 Lab:  LIPID PANEL   
  
 09/06/2018 Imaging:  CHRISTIANO MAMMO BI SCREENING INCL CAD   
  
 09/06/2018 Lab:  METABOLIC PANEL, BASIC   
  
 09/06/2018 Lab:  T4, FREE   
  
 09/06/2018 Lab:  TSH 3RD GENERATION   
  
 09/06/2018 Lab:  URINALYSIS W/ RFLX MICROSCOPIC   
  
 09/06/2018 Lab:  VITAMIN D, 25 HYDROXY Referral Information Referral ID Referred By Referred To  
  
 [id number] [facility] [address] Phone: [phone] Fax: [phone] Visits Status Start Date End Date 1 New Request 9/6/18 9/6/19 If your referral has a status of pending review or denied, additional information will be sent to support the outcome of this decision. Patient Instructions Well Visit, Women 48 to 72: Care Instructions Your Care Instructions Physical exams can help you stay healthy. Your doctor has checked your overall health and may have suggested ways to take good care of yourself. He or she also may have recommended tests.  At home, you can help prevent illness with healthy eating, regular exercise, and other steps. Follow-up care is a key part of your treatment and safety. Be sure to make and go to all appointments, and call your doctor if you are having problems. It's also a good idea to know your test results and keep a list of the medicines you take. How can you care for yourself at home? · Reach and stay at a healthy weight. This will lower your risk for many problems, such as obesity, diabetes, heart disease, and high blood pressure. · Get at least 30 minutes of exercise on most days of the week. Walking is a good choice. You also may want to do other activities, such as running, swimming, cycling, or playing tennis or team sports. · Do not smoke. Smoking can make health problems worse. If you need help quitting, talk to your doctor about stop-smoking programs and medicines. These can increase your chances of quitting for good. · Protect your skin from too much sun. When you're outdoors from 10 a.m. to 4 p.m., stay in the shade or cover up with clothing and a hat with a wide brim. Wear sunglasses that block UV rays. Even when it's cloudy, put broad-spectrum sunscreen (SPF 30 or higher) on any exposed skin. · See a dentist one or two times a year for checkups and to have your teeth cleaned. · Wear a seat belt in the car. · Limit alcohol to 1 drink a day. Too much alcohol can cause health problems. Follow your doctor's advice about when to have certain tests. These tests can spot problems early. · Cholesterol. Your doctor will tell you how often to have this done based on your age, family history, or other things that can increase your risk for heart attack and stroke. · Blood pressure. Have your blood pressure checked during a routine doctor visit. Your doctor will tell you how often to check your blood pressure based on your age, your blood pressure results, and other factors. · Mammogram. Ask your doctor how often you should have a mammogram, which is an X-ray of your breasts. A mammogram can spot breast cancer before it can be felt and when it is easiest to treat. · Pap test and pelvic exam. Ask your doctor how often you should have a Pap test. You may not need to have a Pap test as often as you used to. · Vision. Have your eyes checked every year or two or as often as your doctor suggests. Some experts recommend that you have yearly exams for glaucoma and other age-related eye problems starting at age 48. · Hearing. Tell your doctor if you notice any change in your hearing. You can have tests to find out how well you hear. · Diabetes. Ask your doctor whether you should have tests for diabetes. · Colon cancer. You should begin tests for colon cancer at age 48. You may have one of several tests. Your doctor will tell you how often to have tests based on your age and risk. Risks include whether you already had a precancerous polyp removed from your colon or whether your parents, sisters and brothers, or children have had colon cancer. · Thyroid disease. Talk to your doctor about whether to have your thyroid checked as part of a regular physical exam. Women have an increased chance of a thyroid problem. · Osteoporosis. You should begin tests for bone density at age 72. If you are younger than 72, ask your doctor whether you have factors that may increase your risk for this disease. You may want to have this test before age 72. · Heart attack and stroke risk. At least every 4 to 6 years, you should have your risk for heart attack and stroke assessed. Your doctor uses factors such as your age, blood pressure, cholesterol, and whether you smoke or have diabetes to show what your risk for a heart attack or stroke is over the next 10 years. When should you call for help?  
Watch closely for changes in your health, and be sure to contact your doctor if you have any problems or symptoms that concern you. Where can you learn more? Go to http://nicola-kobe.info/. Enter O036 in the search box to learn more about \"Well Visit, Women 50 to 72: Care Instructions. \" Current as of: May 16, 2017 Content Version: 11.7 © 1420-7168 [employer]. Care instructions adapted under license by [employer] (which disclaims liability or warranty for this information). If you have questions about a medical condition or this instruction, always ask your healthcare professional. [address] any warranty or liability for your use of this information. Introducing \[facility]\"" & HEALTH SERVICES! Dear Denins Deras: Thank you for requesting a Wyldfire account. Our records indicate that you already have an active Wyldfire account. You can access your account anytime at [email or web address] Did you know that you can access your hospital and ER discharge instructions at any time in Wyldfire? You can also review all of your test results from your hospital stay or ER visit. Additional Information If you have questions, please visit the Frequently Asked Questions section of the Wyldfire website at [email or web address] Remember, Wyldfire is NOT to be used for urgent needs. For medical emergencies, dial 911. Now available from your iPhone and Android! Please provide this summary of care documentation to your next provider. Your primary care clinician is listed as Nichelle 51. If you have any questions after today's visit, please call [phone].

## 2018-09-10 ENCOUNTER — HOSPITAL ENCOUNTER (OUTPATIENT)
Dept: LAB | Age: 58
Discharge: HOME | End: 2018-09-10
Payer: COMMERCIAL

## 2018-09-10 DIAGNOSIS — Z11.59 NEED FOR HEPATITIS C SCREENING TEST: ICD-10-CM

## 2018-09-10 DIAGNOSIS — Z00.00 ANNUAL PHYSICAL EXAM: ICD-10-CM

## 2018-09-10 LAB
25(OH)D3 SERPL-MCNC: 21 NG/ML (ref 30–100)
ALBUMIN SERPL-MCNC: 4.1 G/DL (ref 3.4–5)
ALBUMIN/GLOB SERPL: 1.1 {RATIO} (ref 0.8–1.7)
ALP SERPL-CCNC: 90 U/L (ref 45–117)
ALT SERPL-CCNC: 63 U/L (ref 13–56)
ANION GAP SERPL CALC-SCNC: 7 MMOL/L (ref 3–18)
APPEARANCE UR: CLEAR
AST SERPL-CCNC: 33 U/L (ref 15–37)
BACTERIA URNS QL MICRO: ABNORMAL /HPF
BASOPHILS # BLD: 0 K/UL (ref 0–0.1)
BASOPHILS NFR BLD: 0 % (ref 0–2)
BILIRUB DIRECT SERPL-MCNC: 0.3 MG/DL (ref 0–0.2)
BILIRUB SERPL-MCNC: 1.7 MG/DL (ref 0.2–1)
BILIRUB UR QL: NEGATIVE
BUN SERPL-MCNC: 13 MG/DL (ref 7–18)
BUN/CREAT SERPL: 16 (ref 12–20)
CALCIUM SERPL-MCNC: 9.2 MG/DL (ref 8.5–10.1)
CHLORIDE SERPL-SCNC: 104 MMOL/L (ref 100–108)
CHOLEST SERPL-MCNC: 275 MG/DL
CO2 SERPL-SCNC: 28 MMOL/L (ref 21–32)
COLOR UR: YELLOW
CREAT SERPL-MCNC: 0.79 MG/DL (ref 0.6–1.3)
DIFFERENTIAL METHOD BLD: NORMAL
EOSINOPHIL # BLD: 0 K/UL (ref 0–0.4)
EOSINOPHIL NFR BLD: 0 % (ref 0–5)
EPITH CASTS URNS QL MICRO: ABNORMAL /LPF (ref 0–5)
ERYTHROCYTE [DISTWIDTH] IN BLOOD BY AUTOMATED COUNT: 13.3 % (ref 11.6–14.5)
GLOBULIN SER CALC-MCNC: 3.7 G/DL (ref 2–4)
GLUCOSE SERPL-MCNC: 98 MG/DL (ref 74–99)
GLUCOSE UR STRIP.AUTO-MCNC: NEGATIVE MG/DL
HCT VFR BLD AUTO: 43.1 % (ref 35–45)
HDLC SERPL-MCNC: 73 MG/DL (ref 40–60)
HDLC SERPL: 3.8 {RATIO} (ref 0–5)
HGB BLD-MCNC: 14.3 G/DL (ref 12–16)
HGB UR QL STRIP: NEGATIVE
KETONES UR QL STRIP.AUTO: NEGATIVE MG/DL
LDLC SERPL CALC-MCNC: 147.2 MG/DL (ref 0–100)
LEUKOCYTE ESTERASE UR QL STRIP.AUTO: ABNORMAL
LIPID PROFILE,FLP: ABNORMAL
LYMPHOCYTES # BLD: 1.8 K/UL (ref 0.9–3.6)
LYMPHOCYTES NFR BLD: 34 % (ref 21–52)
MCH RBC QN AUTO: 30.6 PG (ref 24–34)
MCHC RBC AUTO-ENTMCNC: 33.2 G/DL (ref 31–37)
MCV RBC AUTO: 92.3 FL (ref 74–97)
MONOCYTES # BLD: 0.4 K/UL (ref 0.05–1.2)
MONOCYTES NFR BLD: 8 % (ref 3–10)
NEUTS SEG # BLD: 3.1 K/UL (ref 1.8–8)
NEUTS SEG NFR BLD: 58 % (ref 40–73)
NITRITE UR QL STRIP.AUTO: NEGATIVE
PH UR STRIP: 6.5 [PH] (ref 5–8)
PLATELET # BLD AUTO: 314 K/UL (ref 135–420)
PMV BLD AUTO: 9.3 FL (ref 9.2–11.8)
POTASSIUM SERPL-SCNC: 3.5 MMOL/L (ref 3.5–5.5)
PROT SERPL-MCNC: 7.8 G/DL (ref 6.4–8.2)
PROT UR STRIP-MCNC: NEGATIVE MG/DL
RBC # BLD AUTO: 4.67 M/UL (ref 4.2–5.3)
RBC #/AREA URNS HPF: 0 /HPF (ref 0–5)
SODIUM SERPL-SCNC: 139 MMOL/L (ref 136–145)
SP GR UR REFRACTOMETRY: 1.02 (ref 1–1.03)
T4 FREE SERPL-MCNC: 1 NG/DL (ref 0.7–1.5)
TRIGL SERPL-MCNC: 274 MG/DL (ref ?–150)
TSH SERPL DL<=0.05 MIU/L-ACNC: 1.9 UIU/ML (ref 0.36–3.74)
UROBILINOGEN UR QL STRIP.AUTO: 0.2 EU/DL (ref 0.2–1)
VLDLC SERPL CALC-MCNC: 54.8 MG/DL
WBC # BLD AUTO: 5.3 K/UL (ref 4.6–13.2)
WBC URNS QL MICRO: ABNORMAL /HPF (ref 0–4)

## 2018-09-10 PROCEDURE — 36415 COLL VENOUS BLD VENIPUNCTURE: CPT | Performed by: INTERNAL MEDICINE

## 2018-09-10 PROCEDURE — 86803 HEPATITIS C AB TEST: CPT | Performed by: INTERNAL MEDICINE

## 2018-09-10 PROCEDURE — 84443 ASSAY THYROID STIM HORMONE: CPT | Performed by: INTERNAL MEDICINE

## 2018-09-10 PROCEDURE — 85025 COMPLETE CBC W/AUTO DIFF WBC: CPT | Performed by: INTERNAL MEDICINE

## 2018-09-10 PROCEDURE — 80061 LIPID PANEL: CPT | Performed by: INTERNAL MEDICINE

## 2018-09-10 PROCEDURE — 80048 BASIC METABOLIC PNL TOTAL CA: CPT | Performed by: INTERNAL MEDICINE

## 2018-09-10 PROCEDURE — 82306 VITAMIN D 25 HYDROXY: CPT | Performed by: INTERNAL MEDICINE

## 2018-09-10 PROCEDURE — 81001 URINALYSIS AUTO W/SCOPE: CPT | Performed by: INTERNAL MEDICINE

## 2018-09-10 PROCEDURE — 84439 ASSAY OF FREE THYROXINE: CPT | Performed by: INTERNAL MEDICINE

## 2018-09-10 PROCEDURE — 80076 HEPATIC FUNCTION PANEL: CPT | Performed by: INTERNAL MEDICINE

## 2018-09-11 LAB
HCV AB SER IA-ACNC: 0.11 INDEX
HCV AB SERPL QL IA: NEGATIVE
HCV COMMENT,HCGAC: NORMAL

## 2018-09-16 NOTE — PROGRESS NOTES
Vit d low. Increase dose. Her cholesterol is very high. I forget. ..did she tell me that she has been off her Crestor? If yes, get back on it. If no, then increase to 20 mg. Rest of labs are fine.

## 2018-09-17 NOTE — PROGRESS NOTES
Patient notified of results she voiced understanding has been taking Crestor will take 2 tablets of the 10 mg and call for new script when she starts to get low and is taking vitamin d 1,000 units will increase in to 2,000 units a day

## 2018-10-02 RX ORDER — LEVOTHYROXINE SODIUM 88 UG/1
TABLET ORAL
Qty: 90 TAB | Refills: 1 | Status: SHIPPED | OUTPATIENT
Start: 2018-10-02 | End: 2019-07-27 | Stop reason: SDUPTHER

## 2018-10-19 DIAGNOSIS — Z12.39 BREAST CANCER SCREENING: ICD-10-CM

## 2019-02-05 RX ORDER — AZITHROMYCIN 250 MG/1
TABLET, FILM COATED ORAL
Qty: 6 TAB | Refills: 0 | Status: SHIPPED | OUTPATIENT
Start: 2019-02-05 | End: 2019-02-10

## 2019-04-01 RX ORDER — LOSARTAN POTASSIUM AND HYDROCHLOROTHIAZIDE 12.5; 5 MG/1; MG/1
TABLET ORAL
Qty: 90 TAB | Refills: 1 | Status: SHIPPED | OUTPATIENT
Start: 2019-04-01 | End: 2019-09-20 | Stop reason: SDUPTHER

## 2019-09-22 DIAGNOSIS — Z00.00 ANNUAL PHYSICAL EXAM: Primary | ICD-10-CM

## 2019-11-07 ENCOUNTER — HOSPITAL ENCOUNTER (OUTPATIENT)
Dept: LAB | Age: 59
Discharge: HOME | End: 2019-11-07
Payer: COMMERCIAL

## 2019-11-07 DIAGNOSIS — Z00.00 ANNUAL PHYSICAL EXAM: ICD-10-CM

## 2019-11-07 LAB
ALBUMIN SERPL-MCNC: 4.4 G/DL (ref 3.4–5)
ALBUMIN/GLOB SERPL: 1.2 {RATIO} (ref 0.8–1.7)
ALP SERPL-CCNC: 104 U/L (ref 45–117)
ALT SERPL-CCNC: 149 U/L (ref 13–56)
ANION GAP SERPL CALC-SCNC: 6 MMOL/L (ref 3–18)
APPEARANCE UR: CLEAR
AST SERPL-CCNC: 64 U/L (ref 10–38)
BASOPHILS # BLD: 0 K/UL (ref 0–0.1)
BASOPHILS NFR BLD: 0 % (ref 0–2)
BILIRUB DIRECT SERPL-MCNC: 0.3 MG/DL (ref 0–0.2)
BILIRUB SERPL-MCNC: 1.4 MG/DL (ref 0.2–1)
BILIRUB UR QL: NEGATIVE
BUN SERPL-MCNC: 11 MG/DL (ref 7–18)
BUN/CREAT SERPL: 15 (ref 12–20)
CALCIUM SERPL-MCNC: 9.3 MG/DL (ref 8.5–10.1)
CHLORIDE SERPL-SCNC: 105 MMOL/L (ref 100–111)
CO2 SERPL-SCNC: 29 MMOL/L (ref 21–32)
COLOR UR: YELLOW
CREAT SERPL-MCNC: 0.74 MG/DL (ref 0.6–1.3)
DIFFERENTIAL METHOD BLD: NORMAL
EOSINOPHIL # BLD: 0 K/UL (ref 0–0.4)
EOSINOPHIL NFR BLD: 0 % (ref 0–5)
ERYTHROCYTE [DISTWIDTH] IN BLOOD BY AUTOMATED COUNT: 12.9 % (ref 11.6–14.5)
GLOBULIN SER CALC-MCNC: 3.6 G/DL (ref 2–4)
GLUCOSE SERPL-MCNC: 99 MG/DL (ref 74–99)
GLUCOSE UR STRIP.AUTO-MCNC: NEGATIVE MG/DL
HCT VFR BLD AUTO: 42.7 % (ref 35–45)
HGB BLD-MCNC: 13.8 G/DL (ref 12–16)
HGB UR QL STRIP: NEGATIVE
KETONES UR QL STRIP.AUTO: NEGATIVE MG/DL
LEUKOCYTE ESTERASE UR QL STRIP.AUTO: NEGATIVE
LYMPHOCYTES # BLD: 2 K/UL (ref 0.9–3.6)
LYMPHOCYTES NFR BLD: 36 % (ref 21–52)
MCH RBC QN AUTO: 30.5 PG (ref 24–34)
MCHC RBC AUTO-ENTMCNC: 32.3 G/DL (ref 31–37)
MCV RBC AUTO: 94.3 FL (ref 74–97)
MONOCYTES # BLD: 0.4 K/UL (ref 0.05–1.2)
MONOCYTES NFR BLD: 7 % (ref 3–10)
NEUTS SEG # BLD: 3.2 K/UL (ref 1.8–8)
NEUTS SEG NFR BLD: 57 % (ref 40–73)
NITRITE UR QL STRIP.AUTO: NEGATIVE
PH UR STRIP: 7 [PH] (ref 5–8)
PLATELET # BLD AUTO: 321 K/UL (ref 135–420)
PMV BLD AUTO: 9.8 FL (ref 9.2–11.8)
POTASSIUM SERPL-SCNC: 4.3 MMOL/L (ref 3.5–5.5)
PROT SERPL-MCNC: 8 G/DL (ref 6.4–8.2)
PROT UR STRIP-MCNC: NEGATIVE MG/DL
RBC # BLD AUTO: 4.53 M/UL (ref 4.2–5.3)
SODIUM SERPL-SCNC: 140 MMOL/L (ref 136–145)
SP GR UR REFRACTOMETRY: 1.01 (ref 1–1.03)
TSH SERPL DL<=0.05 MIU/L-ACNC: 9.06 UIU/ML (ref 0.36–3.74)
UROBILINOGEN UR QL STRIP.AUTO: 0.2 EU/DL (ref 0.2–1)
WBC # BLD AUTO: 5.6 K/UL (ref 4.6–13.2)

## 2019-11-07 PROCEDURE — 80048 BASIC METABOLIC PNL TOTAL CA: CPT

## 2019-11-07 PROCEDURE — 85025 COMPLETE CBC W/AUTO DIFF WBC: CPT

## 2019-11-07 PROCEDURE — 36415 COLL VENOUS BLD VENIPUNCTURE: CPT

## 2019-11-07 PROCEDURE — 84443 ASSAY THYROID STIM HORMONE: CPT

## 2019-11-07 PROCEDURE — 80076 HEPATIC FUNCTION PANEL: CPT

## 2019-11-07 PROCEDURE — 82306 VITAMIN D 25 HYDROXY: CPT

## 2019-11-07 PROCEDURE — 80061 LIPID PANEL: CPT

## 2019-11-07 PROCEDURE — 84439 ASSAY OF FREE THYROXINE: CPT

## 2019-11-07 PROCEDURE — 81003 URINALYSIS AUTO W/O SCOPE: CPT

## 2019-11-08 LAB
25(OH)D3 SERPL-MCNC: 59.6 NG/ML (ref 30–100)
CHOLEST SERPL-MCNC: 174 MG/DL
HDLC SERPL-MCNC: 72 MG/DL (ref 40–60)
HDLC SERPL: 2.4 {RATIO} (ref 0–5)
LDLC SERPL CALC-MCNC: 83.4 MG/DL (ref 0–100)
LIPID PROFILE,FLP: ABNORMAL
T4 FREE SERPL-MCNC: 0.8 NG/DL (ref 0.7–1.5)
TRIGL SERPL-MCNC: 93 MG/DL (ref ?–150)
VLDLC SERPL CALC-MCNC: 18.6 MG/DL

## 2019-11-12 ENCOUNTER — OFFICE VISIT (OUTPATIENT)
Dept: FAMILY MEDICINE CLINIC | Age: 59
End: 2019-11-12

## 2019-11-12 ENCOUNTER — HOSPITAL ENCOUNTER (OUTPATIENT)
Dept: LAB | Age: 59
Discharge: HOME | End: 2019-11-12
Payer: COMMERCIAL

## 2019-11-12 VITALS
BODY MASS INDEX: 27.82 KG/M2 | SYSTOLIC BLOOD PRESSURE: 130 MMHG | DIASTOLIC BLOOD PRESSURE: 90 MMHG | HEART RATE: 96 BPM | HEIGHT: 65 IN | TEMPERATURE: 97.9 F | WEIGHT: 167 LBS | RESPIRATION RATE: 16 BRPM | OXYGEN SATURATION: 98 %

## 2019-11-12 DIAGNOSIS — F32.0 MILD SINGLE CURRENT EPISODE OF MAJOR DEPRESSIVE DISORDER (HCC): ICD-10-CM

## 2019-11-12 DIAGNOSIS — E03.4 HYPOTHYROIDISM DUE TO ACQUIRED ATROPHY OF THYROID: ICD-10-CM

## 2019-11-12 DIAGNOSIS — Z00.00 ANNUAL PHYSICAL EXAM: Primary | ICD-10-CM

## 2019-11-12 DIAGNOSIS — R79.89 ABNORMAL LIVER FUNCTION TESTS: ICD-10-CM

## 2019-11-12 DIAGNOSIS — Z12.39 BREAST CANCER SCREENING: ICD-10-CM

## 2019-11-12 DIAGNOSIS — E66.3 OVERWEIGHT (BMI 25.0-29.9): ICD-10-CM

## 2019-11-12 DIAGNOSIS — E78.00 PURE HYPERCHOLESTEROLEMIA: ICD-10-CM

## 2019-11-12 DIAGNOSIS — I10 ESSENTIAL HYPERTENSION: ICD-10-CM

## 2019-11-12 DIAGNOSIS — Z78.0 POSTMENOPAUSAL: ICD-10-CM

## 2019-11-12 LAB
ALBUMIN SERPL-MCNC: 4.6 G/DL (ref 3.4–5)
ALBUMIN/GLOB SERPL: 1.2 {RATIO} (ref 0.8–1.7)
ALP SERPL-CCNC: 112 U/L (ref 45–117)
ALT SERPL-CCNC: 203 U/L (ref 13–56)
AST SERPL-CCNC: 102 U/L (ref 10–38)
BILIRUB DIRECT SERPL-MCNC: 0.5 MG/DL (ref 0–0.2)
BILIRUB SERPL-MCNC: 2 MG/DL (ref 0.2–1)
FERRITIN SERPL-MCNC: 665 NG/ML (ref 8–388)
GLOBULIN SER CALC-MCNC: 3.9 G/DL (ref 2–4)
IRON SATN MFR SERPL: 31 %
IRON SERPL-MCNC: 106 UG/DL (ref 50–175)
PROT SERPL-MCNC: 8.5 G/DL (ref 6.4–8.2)
T4 FREE SERPL-MCNC: 1 NG/DL (ref 0.7–1.5)
TIBC SERPL-MCNC: 337 UG/DL (ref 250–450)
TSH SERPL DL<=0.05 MIU/L-ACNC: 4.41 UIU/ML (ref 0.36–3.74)

## 2019-11-12 PROCEDURE — 82728 ASSAY OF FERRITIN: CPT

## 2019-11-12 PROCEDURE — 84439 ASSAY OF FREE THYROXINE: CPT

## 2019-11-12 PROCEDURE — 84443 ASSAY THYROID STIM HORMONE: CPT

## 2019-11-12 PROCEDURE — 80076 HEPATIC FUNCTION PANEL: CPT

## 2019-11-12 PROCEDURE — 83516 IMMUNOASSAY NONANTIBODY: CPT

## 2019-11-12 PROCEDURE — 87340 HEPATITIS B SURFACE AG IA: CPT

## 2019-11-12 PROCEDURE — 86706 HEP B SURFACE ANTIBODY: CPT

## 2019-11-12 PROCEDURE — 36415 COLL VENOUS BLD VENIPUNCTURE: CPT

## 2019-11-12 PROCEDURE — 86038 ANTINUCLEAR ANTIBODIES: CPT

## 2019-11-12 PROCEDURE — 86376 MICROSOMAL ANTIBODY EACH: CPT

## 2019-11-12 PROCEDURE — 86803 HEPATITIS C AB TEST: CPT

## 2019-11-12 PROCEDURE — 83540 ASSAY OF IRON: CPT

## 2019-11-12 RX ORDER — LEVOTHYROXINE SODIUM 88 UG/1
TABLET ORAL
Qty: 90 TAB | Refills: 1 | Status: SHIPPED | OUTPATIENT
Start: 2019-11-12 | End: 2020-11-13 | Stop reason: SDUPTHER

## 2019-11-12 RX ORDER — CLINDAMYCIN HYDROCHLORIDE 300 MG/1
300 CAPSULE ORAL 3 TIMES DAILY
COMMUNITY

## 2019-11-12 RX ORDER — ESCITALOPRAM OXALATE 5 MG/1
TABLET ORAL
Qty: 90 TAB | Refills: 2 | Status: SHIPPED | OUTPATIENT
Start: 2019-11-12 | End: 2020-05-07

## 2019-11-12 RX ORDER — LOSARTAN POTASSIUM AND HYDROCHLOROTHIAZIDE 12.5; 5 MG/1; MG/1
TABLET ORAL
Qty: 90 TAB | Refills: 1 | Status: SHIPPED | OUTPATIENT
Start: 2019-11-12 | End: 2020-11-13 | Stop reason: SDUPTHER

## 2019-11-12 NOTE — PROGRESS NOTES
SUBJECTIVE:   64 y.o. female for annual routine checkup. Pt struggling wit her weight for years. Hypothyroidism: has missed her medication for a month so her TSH is high. She has been having a multitude of symptoms that have made her feel bad. They all seem to be related to missing her thyroid medication. She is discouraged by her inability to lose weight. She would like to take something to help this but we can not consider anything at present with her LFTs at high level. We do know that she has a fatty liver from a prior US done a few years ago. Current Outpatient Medications   Medication Sig Dispense Refill    clindamycin (CLEOCIN) 300 mg capsule Take 300 mg by mouth three (3) times daily.  escitalopram oxalate (LEXAPRO) 5 mg tablet take 1 tablet by mouth once daily 90 Tab 2    levothyroxine (SYNTHROID) 88 mcg tablet take 1 tablet by mouth once daily BEFORE BREAKFAST 90 Tab 1    losartan-hydroCHLOROthiazide (HYZAAR) 50-12.5 mg per tablet take 1 tablet by mouth once daily 90 Tab 1    montelukast (SINGULAIR) 10 mg tablet take 1 tablet by mouth once daily 90 Tab 1    rosuvastatin (CRESTOR) 10 mg tablet take 1 tablet by mouth at bedtime 90 Tab 1    cholecalciferol, vitamin D3, (VITAMIN D3) 2,000 unit tab Take  by mouth.  multivitamin (ONE A DAY) tablet Take 1 tablet by mouth daily. Past Medical History:   Diagnosis Date    Anxiety 1/16/2010    Cervical herniated disc 4/28/2014    Dr. Christen Rosales ([facility]).  Endometriosis 10/28/2011    Fatty liver 7/28/2017    HTN (hypertension) 1/16/2010    Hypercholesterolemia     Hyperlipidemia 1/16/2010    Hypothyroidism 1/22/2015    Lupus ([address] 1/16/2010    Migraine 1/16/2010    Refused influenza vaccine 9/6/2018    Sinusitis        Allergies: Ancef [cefazolin]; Bactrim [sulfamethoxazole-trimethoprim]; Eryc [erythromycin];  Lisinopril; Pcn [penicillins]; Probiotic [lactobacillus acidophilus]; and Soybean oil Patient's last menstrual period was 10/18/2011. ROS:  Feeling well. No dyspnea or chest pain on exertion. No abdominal pain, change in bowel habits, black or bloody stools. No urinary tract symptoms. GYN ROS: no breast pain or new or enlarging lumps on self exam. No neurological complaints. OBJECTIVE:   The patient appears well, alert, oriented x 3, in no distress. Visit Vitals  /90 (BP 1 Location: Left arm, BP Patient Position: Sitting)   Pulse 96   Temp 97.9 °F (36.6 °C) (Oral)   Resp 16   Ht 5' 4.57\" (1.64 m)   Wt 167 lb (75.8 kg)   LMP 10/18/2011   SpO2 98%   BMI 28.16 kg/m²       General appearance: alert, cooperative, no distress, appears stated age  Head: Normocephalic, without obvious abnormality, atraumatic  Ears: normal TM's and external ear canals AU  Throat: Lips, mucosa, and tongue normal. Teeth and gums normal  Neck: supple, symmetrical, trachea midline, no adenopathy, thyroid: not enlarged, symmetric, no tenderness/mass/nodules, no carotid bruit and no JVD  Back: symmetric, no curvature. ROM normal. No CVA tenderness. Lungs: clear to auscultation bilaterally  Breasts: breasts appear normal, no suspicious masses, no skin or nipple changes or axillary nodes. Heart: regular rate and rhythm, S1, S2 normal, no murmur, click, rub or gallop  Abdomen: soft, non-tender. Bowel sounds normal. No masses,  no organomegaly  Extremities: extremities normal, atraumatic, no cyanosis or edema  Pulses: 2+ and symmetric  Skin: Skin color, texture, turgor normal. No rashes or lesions  Neurological is normal, no focal findings.            Lab Results   Component Value Date/Time    WBC 5.6 11/07/2019 07:40 AM    HGB 13.8 11/07/2019 07:40 AM    HCT 42.7 11/07/2019 07:40 AM    PLATELET 873 66/40/0850 07:40 AM    MCV 94.3 11/07/2019 07:40 AM         Lab Results   Component Value Date/Time    Sodium 140 11/07/2019 07:40 AM    Potassium 4.3 11/07/2019 07:40 AM    Chloride 105 11/07/2019 07:40 AM    CO2 29 11/07/2019 07:40 AM    Anion gap 6 11/07/2019 07:40 AM    Glucose 99 11/07/2019 07:40 AM    BUN 11 11/07/2019 07:40 AM    Creatinine 0.74 11/07/2019 07:40 AM    BUN/Creatinine ratio 15 11/07/2019 07:40 AM    GFR est AA >60 11/07/2019 07:40 AM    GFR est non-AA >60 11/07/2019 07:40 AM    Calcium 9.3 11/07/2019 07:40 AM         Lab Results   Component Value Date/Time    ALT (SGPT) 203 (H) 11/12/2019 09:43 AM    AST (SGOT) 102 (H) 11/12/2019 09:43 AM    Alk. phosphatase 112 11/12/2019 09:43 AM    Bilirubin, direct 0.5 (H) 11/12/2019 09:43 AM    Bilirubin, total 2.0 (H) 11/12/2019 09:43 AM         Lab Results   Component Value Date/Time    Cholesterol, total 174 11/07/2019 07:40 AM    HDL Cholesterol 72 (H) 11/07/2019 07:40 AM    LDL, calculated 83.4 11/07/2019 07:40 AM    VLDL, calculated 18.6 11/07/2019 07:40 AM    Triglyceride 93 11/07/2019 07:40 AM    CHOL/HDL Ratio 2.4 11/07/2019 07:40 AM         Lab Results   Component Value Date/Time    TSH 4.41 (H) 11/12/2019 09:43 AM    T4, Free 1.0 11/12/2019 09:33 AM         Lab Results   Component Value Date/Time    Vitamin D 25-Hydroxy 59.6 11/07/2019 07:40 AM             ASSESSMENT:   Diagnoses and all orders for this visit:    1. Annual physical exam    2. Mild single current episode of major depressive disorder (HCC)  -     escitalopram oxalate (LEXAPRO) 5 mg tablet; take 1 tablet by mouth once daily    3. Essential hypertension  -     losartan-hydroCHLOROthiazide (HYZAAR) 50-12.5 mg per tablet; take 1 tablet by mouth once daily    4. Pure hypercholesterolemia    5. Hypothyroidism due to acquired atrophy of thyroid  -     levothyroxine (SYNTHROID) 88 mcg tablet; take 1 tablet by mouth once daily BEFORE BREAKFAST    6. Abnormal liver function tests  -     HEPATIC FUNCTION PANEL; Future  -     HEP B SURFACE AG; Future  -     HEP B SURFACE AB; Future  -     IRON PROFILE; Future  -     FERRITIN; Future  -     ACTIN (SMOOTH MUSCLE) ANTIBODY;  Future  -     ANTINUCLEAR ANTIBODIES, IFA; Future  -     LIVER-KIDNEY MICROSOMAL AB; Future  -     TSH 3RD GENERATION; Future  -     T4, FREE; Future  -     HEPATITIS C AB; Future    7. Breast cancer screening  -     CHRISTIANO MAMMO BI SCREENING INCL CAD; Future    8. Overweight (BMI 25.0-29.9)    9. Postmenopausal  -     DEXA BONE DENSITY STUDY AXIAL; Future          PLAN:   Mammogram: due now. DEXA: due now. Colonoscopy: UTD. Pap: she will call her Gyn. Will do additional labs for liver now. Will consider doing an US and referral to GI after reviwing labs. Will then return in 8 weeks to repeat TFTs. The plan was discussed with the patient. The patient verbalized understanding and is in agreement with the plan. All medication potential side effects were discussed with the patient.

## 2019-11-12 NOTE — PROGRESS NOTES
Lia Perez is a 64 y.o. female (: 1960) presenting to address:    Chief Complaint   Patient presents with    Complete Physical    Bloated     heaviness on right side come and goes        Vitals:    19 0844   BP: 140/85   Pulse: 96   Resp: 16   Temp: 97.9 °F (36.6 °C)   TempSrc: Oral   SpO2: 98%   Weight: 167 lb (75.8 kg)   Height: 5' 4.57\" (1.64 m)   PainSc:   0 - No pain   LMP: 10/18/2011       Hearing/Vision:   No exam data present    Learning Assessment:     Learning Assessment 2015   PRIMARY LEARNER Patient   HIGHEST LEVEL OF EDUCATION - PRIMARY LEARNER  -   BARRIERS PRIMARY LEARNER -   CO-LEARNER CAREGIVER -   PRIMARY LANGUAGE ENGLISH   LEARNER PREFERENCE PRIMARY LISTENING   ANSWERED BY patient   RELATIONSHIP SELF     Depression Screening:     3 most recent PHQ Screens 2019   Little interest or pleasure in doing things Not at all   Feeling down, depressed, irritable, or hopeless Not at all   Total Score PHQ 2 0     Fall Risk Assessment:     Fall Risk Assessment, last 12 mths 2019   Able to walk? Yes   Fall in past 12 months? No     Abuse Screening:     Abuse Screening Questionnaire 2019   Do you ever feel afraid of your partner? N   Are you in a relationship with someone who physically or mentally threatens you? N   Is it safe for you to go home? Y     Coordination of Care Questionaire:   1. Have you been to the ER, urgent care clinic since your last visit? Hospitalized since your last visit? NO    2. Have you seen or consulted any other health care providers outside of the [address] [place] [address] since your last visit? Include any pap smears or colon screening. NO    Advanced Directive:   1. Do you have an Advanced Directive? NO    2. Would you like information on Advanced Directives?  NO

## 2019-11-12 NOTE — PATIENT INSTRUCTIONS
High Blood Pressure: Care Instructions Overview It's normal for blood pressure to go up and down throughout the day. But if it stays up, you have high blood pressure. Another name for high blood pressure is hypertension. Despite what a lot of people think, high blood pressure usually doesn't cause headaches or make you feel dizzy or lightheaded. It usually has no symptoms. But it does increase your risk of stroke, heart attack, and other problems. You and your doctor will talk about your risks of these problems based on your blood pressure. Your doctor will give you a goal for your blood pressure. Your goal will be based on your health and your age. Lifestyle changes, such as eating healthy and being active, are always important to help lower blood pressure. You might also take medicine to reach your blood pressure goal. 
Follow-up care is a key part of your treatment and safety. Be sure to make and go to all appointments, and call your doctor if you are having problems. It's also a good idea to know your test results and keep a list of the medicines you take. How can you care for yourself at home? Medical treatment · If you stop taking your medicine, your blood pressure will go back up. You may take one or more types of medicine to lower your blood pressure. Be safe with medicines. Take your medicine exactly as prescribed. Call your doctor if you think you are having a problem with your medicine. · Talk to your doctor before you start taking aspirin every day. Aspirin can help certain people lower their risk of a heart attack or stroke. But taking aspirin isn't right for everyone, because it can cause serious bleeding. · See your doctor regularly. You may need to see the doctor more often at first or until your blood pressure comes down. · If you are taking blood pressure medicine, talk to your doctor before you take decongestants or anti-inflammatory medicine, such as ibuprofen. Some of these medicines can raise blood pressure. · Learn how to check your blood pressure at home. Lifestyle changes · Stay at a healthy weight. This is especially important if you put on weight around the waist. Losing even 10 pounds can help you lower your blood pressure. · If your doctor recommends it, get more exercise. Walking is a good choice. Bit by bit, increase the amount you walk every day. Try for at least 30 minutes on most days of the week. You also may want to swim, bike, or do other activities. · Avoid or limit alcohol. Talk to your doctor about whether you can drink any alcohol. · Try to limit how much sodium you eat to less than 2,300 milligrams (mg) a day. Your doctor may ask you to try to eat less than 1,500 mg a day. · Eat plenty of fruits (such as bananas and oranges), vegetables, legumes, whole grains, and low-fat dairy products. · Lower the amount of saturated fat in your diet. Saturated fat is found in animal products such as milk, cheese, and meat. Limiting these foods may help you lose weight and also lower your risk for heart disease. · Do not smoke. Smoking increases your risk for heart attack and stroke. If you need help quitting, talk to your doctor about stop-smoking programs and medicines. These can increase your chances of quitting for good. When should you call for help? Call  911 anytime you think you may need emergency care. This may mean having symptoms that suggest that your blood pressure is causing a serious heart or blood vessel problem. Your blood pressure may be over 180/120. 
 For example, call  911 if: 
  · You have symptoms of a heart attack. These may include: 
? Chest pain or pressure, or a strange feeling in the chest. 
? Sweating. ? Shortness of breath. ? Nausea or vomiting. ? Pain, pressure, or a strange feeling in the back, neck, jaw, or upper belly or in one or both shoulders or arms. ? Lightheadedness or sudden weakness. ? A fast or irregular heartbeat.  
  · You have symptoms of a stroke. These may include: 
? Sudden numbness, tingling, weakness, or loss of movement in your face, arm, or leg, especially on only one side of your body. ? Sudden vision changes. ? Sudden trouble speaking. ? Sudden confusion or trouble understanding simple statements. ? Sudden problems with walking or balance. ? A sudden, severe headache that is different from past headaches.  
  · You have severe back or belly pain.  
 Do not wait until your blood pressure comes down on its own. Get help right away. 
 Call your doctor now or seek immediate care if: 
  · Your blood pressure is much higher than normal (such as 180/120 or higher), but you don't have symptoms.  
  · You think high blood pressure is causing symptoms, such as: 
? Severe headache. 
? Blurry vision.  
 Watch closely for changes in your health, and be sure to contact your doctor if: 
  · Your blood pressure measures higher than your doctor recommends at least 2 times. That means the top number is higher or the bottom number is higher, or both.  
  · You think you may be having side effects from your blood pressure medicine. Where can you learn more? Go to http://nicola-kobe.info/. Enter I490 in the search box to learn more about \"High Blood Pressure: Care Instructions. \" Current as of: April 9, 2019 Content Version: 12.2 © 8110-7871 [employer]. Care instructions adapted under license by [employer] (which disclaims liability or warranty for this information). If you have questions about a medical condition or this instruction, always ask your healthcare professional. [place] [address] any warranty or liability for your use of this information.

## 2019-11-13 LAB
ACTIN IGG SERPL-ACNC: 27 UNITS (ref 0–19)
ANA TITR SER IF: NEGATIVE {TITER}
HBV SURFACE AB SER QL IA: NEGATIVE
HBV SURFACE AB SERPL IA-ACNC: <3.1 MIU/ML
HBV SURFACE AG SER QL: <0.1 INDEX
HBV SURFACE AG SER QL: NEGATIVE
HCV AB SER IA-ACNC: 0.07 INDEX
HCV AB SERPL QL IA: NEGATIVE
HCV COMMENT,HCGAC: NORMAL
HEP BS AB COMMENT,HBSAC: ABNORMAL
LKM-1 AB SER-ACNC: 0.8 UNITS (ref 0–20)

## 2019-11-17 DIAGNOSIS — R79.89 ABNORMAL LIVER FUNCTION TEST: ICD-10-CM

## 2019-11-17 DIAGNOSIS — R79.89 ELEVATED FERRITIN LEVEL: Primary | ICD-10-CM

## 2019-11-17 NOTE — PROGRESS NOTES
Please tell pt that a few of her liver labs were elevated. I am not sure of their significance just yet. I want her to repeat them again in 3 weeks and I have ordered a few other labs.

## 2019-12-10 ENCOUNTER — HOSPITAL ENCOUNTER (OUTPATIENT)
Dept: LAB | Age: 59
Discharge: HOME | End: 2019-12-10
Payer: COMMERCIAL

## 2019-12-10 DIAGNOSIS — E03.4 HYPOTHYROIDISM DUE TO ACQUIRED ATROPHY OF THYROID: ICD-10-CM

## 2019-12-10 DIAGNOSIS — R79.89 ABNORMAL LIVER FUNCTION TEST: ICD-10-CM

## 2019-12-10 DIAGNOSIS — R79.89 ELEVATED FERRITIN LEVEL: ICD-10-CM

## 2019-12-10 LAB
ALBUMIN SERPL-MCNC: 4.3 G/DL (ref 3.4–5)
ALBUMIN/GLOB SERPL: 1.2 {RATIO} (ref 0.8–1.7)
ALP SERPL-CCNC: 92 U/L (ref 45–117)
ALT SERPL-CCNC: 165 U/L (ref 13–56)
AST SERPL-CCNC: 89 U/L (ref 10–38)
BILIRUB DIRECT SERPL-MCNC: 0.4 MG/DL (ref 0–0.2)
BILIRUB SERPL-MCNC: 1.7 MG/DL (ref 0.2–1)
FERRITIN SERPL-MCNC: 448 NG/ML (ref 8–388)
GLOBULIN SER CALC-MCNC: 3.6 G/DL (ref 2–4)
PROT SERPL-MCNC: 7.9 G/DL (ref 6.4–8.2)
T4 FREE SERPL-MCNC: 1.3 NG/DL (ref 0.7–1.5)
TSH SERPL DL<=0.05 MIU/L-ACNC: 0.16 UIU/ML (ref 0.36–3.74)

## 2019-12-10 PROCEDURE — 36415 COLL VENOUS BLD VENIPUNCTURE: CPT

## 2019-12-10 PROCEDURE — 81256 HFE GENE: CPT

## 2019-12-10 PROCEDURE — 84439 ASSAY OF FREE THYROXINE: CPT

## 2019-12-10 PROCEDURE — 84443 ASSAY THYROID STIM HORMONE: CPT

## 2019-12-10 PROCEDURE — 83516 IMMUNOASSAY NONANTIBODY: CPT

## 2019-12-10 PROCEDURE — 82728 ASSAY OF FERRITIN: CPT

## 2019-12-10 PROCEDURE — 80076 HEPATIC FUNCTION PANEL: CPT

## 2019-12-11 LAB — ACTIN IGG SERPL-ACNC: 25 UNITS (ref 0–19)

## 2019-12-16 LAB — HFE GENE MUT ANL BLD/T: NORMAL

## 2019-12-17 NOTE — PROGRESS NOTES
Please notify pt that her LFTs are still elevated. I want her to go see Dr. Virginia Dey to discuss further. Referral printed.

## 2020-01-16 ENCOUNTER — TELEPHONE (OUTPATIENT)
Dept: FAMILY MEDICINE CLINIC | Age: 60
End: 2020-01-16

## 2020-01-16 NOTE — TELEPHONE ENCOUNTER
----- Message from Saturnino Whittington sent at 1/15/2020 10:20 PM EST -----  Regarding: Non-Urgent Medical Question  Contact: [phone]  I was referred to Dr Amie Ireland in [place] for high liver enzymes   My appointment is for 1/21. They would like the last couple blood work results and tests that were done recently forwarded to them so they will have them when I come in for my appointment. If you have any questions please call me at [phone]. Thanks.  Oliva Monroy

## 2020-04-21 RX ORDER — ROSUVASTATIN CALCIUM 10 MG/1
TABLET, COATED ORAL
Qty: 90 TAB | Refills: 1 | Status: SHIPPED | OUTPATIENT
Start: 2020-04-21 | End: 2020-10-13

## 2020-08-12 ENCOUNTER — TELEPHONE (OUTPATIENT)
Dept: FAMILY MEDICINE CLINIC | Age: 60
End: 2020-08-12

## 2020-08-12 NOTE — TELEPHONE ENCOUNTER
Notify pt that se has had further deterioration on her DEXA scan, in the area of her hips. She has osteoporosis now. We need to start her on medication for this. I recommend Fosamax 70 mg q week. Will give it 2 years and then repeat the DEXA to determine how we will proceed. Pend Rx to me.

## 2020-08-13 NOTE — TELEPHONE ENCOUNTER
Patient notified of results she voiced understanding and agree's to start medication . Order pended .

## 2020-08-17 DIAGNOSIS — Z00.00 ANNUAL PHYSICAL EXAM: Primary | ICD-10-CM

## 2020-08-17 RX ORDER — ALENDRONATE SODIUM 70 MG/1
70 TABLET ORAL
Qty: 12 TAB | Refills: 0 | Status: SHIPPED | OUTPATIENT
Start: 2020-08-17 | End: 2020-11-04

## 2020-11-04 RX ORDER — ALENDRONATE SODIUM 70 MG/1
TABLET ORAL
Qty: 12 TAB | Refills: 0 | Status: SHIPPED | OUTPATIENT
Start: 2020-11-04 | End: 2021-01-28 | Stop reason: SDUPTHER

## 2020-11-05 ENCOUNTER — APPOINTMENT (OUTPATIENT)
Dept: FAMILY MEDICINE CLINIC | Age: 60
End: 2020-11-05

## 2020-11-06 LAB
25(OH)D3+25(OH)D2 SERPL-MCNC: 59.3 NG/ML (ref 30–100)
ALBUMIN SERPL-MCNC: 4.6 G/DL (ref 3.8–4.9)
ALP SERPL-CCNC: 91 IU/L (ref 39–117)
ALT SERPL-CCNC: 99 IU/L (ref 0–32)
AST SERPL-CCNC: 58 IU/L (ref 0–40)
BASOPHILS # BLD AUTO: 0 X10E3/UL (ref 0–0.2)
BASOPHILS NFR BLD AUTO: 1 %
BILIRUB DIRECT SERPL-MCNC: 0.39 MG/DL (ref 0–0.4)
BILIRUB SERPL-MCNC: 1.4 MG/DL (ref 0–1.2)
BUN SERPL-MCNC: 9 MG/DL (ref 6–24)
BUN/CREAT SERPL: 14 (ref 9–23)
CALCIUM SERPL-MCNC: 9.6 MG/DL (ref 8.7–10.2)
CHLORIDE SERPL-SCNC: 103 MMOL/L (ref 96–106)
CHOLEST SERPL-MCNC: 170 MG/DL (ref 100–199)
CO2 SERPL-SCNC: 23 MMOL/L (ref 20–29)
CREAT SERPL-MCNC: 0.64 MG/DL (ref 0.57–1)
EOSINOPHIL # BLD AUTO: 0 X10E3/UL (ref 0–0.4)
EOSINOPHIL NFR BLD AUTO: 0 %
ERYTHROCYTE [DISTWIDTH] IN BLOOD BY AUTOMATED COUNT: 12.4 % (ref 11.7–15.4)
GLUCOSE SERPL-MCNC: 104 MG/DL (ref 65–99)
HCT VFR BLD AUTO: 43.3 % (ref 34–46.6)
HDLC SERPL-MCNC: 70 MG/DL
HGB BLD-MCNC: 14.4 G/DL (ref 11.1–15.9)
IMM GRANULOCYTES # BLD AUTO: 0 X10E3/UL (ref 0–0.1)
IMM GRANULOCYTES NFR BLD AUTO: 0 %
INTERPRETATION, 910389: NORMAL
LDLC SERPL CALC-MCNC: 86 MG/DL (ref 0–99)
LYMPHOCYTES # BLD AUTO: 1.6 X10E3/UL (ref 0.7–3.1)
LYMPHOCYTES NFR BLD AUTO: 32 %
MCH RBC QN AUTO: 30.8 PG (ref 26.6–33)
MCHC RBC AUTO-ENTMCNC: 33.3 G/DL (ref 31.5–35.7)
MCV RBC AUTO: 93 FL (ref 79–97)
MONOCYTES # BLD AUTO: 0.4 X10E3/UL (ref 0.1–0.9)
MONOCYTES NFR BLD AUTO: 8 %
NEUTROPHILS # BLD AUTO: 3 X10E3/UL (ref 1.4–7)
NEUTROPHILS NFR BLD AUTO: 59 %
PLATELET # BLD AUTO: 284 X10E3/UL (ref 150–450)
POTASSIUM SERPL-SCNC: 4 MMOL/L (ref 3.5–5.2)
PROT SERPL-MCNC: 7.3 G/DL (ref 6–8.5)
RBC # BLD AUTO: 4.68 X10E6/UL (ref 3.77–5.28)
SODIUM SERPL-SCNC: 143 MMOL/L (ref 134–144)
T4 FREE SERPL-MCNC: 1.36 NG/DL (ref 0.82–1.77)
TRIGL SERPL-MCNC: 73 MG/DL (ref 0–149)
TSH SERPL DL<=0.005 MIU/L-ACNC: 0.66 UIU/ML (ref 0.45–4.5)
VLDLC SERPL CALC-MCNC: 14 MG/DL (ref 5–40)
WBC # BLD AUTO: 5.1 X10E3/UL (ref 3.4–10.8)

## 2020-11-13 ENCOUNTER — OFFICE VISIT (OUTPATIENT)
Dept: FAMILY MEDICINE CLINIC | Age: 60
End: 2020-11-13
Payer: COMMERCIAL

## 2020-11-13 VITALS
DIASTOLIC BLOOD PRESSURE: 94 MMHG | OXYGEN SATURATION: 97 % | HEART RATE: 71 BPM | BODY MASS INDEX: 28.82 KG/M2 | SYSTOLIC BLOOD PRESSURE: 142 MMHG | WEIGHT: 173 LBS | HEIGHT: 65 IN | RESPIRATION RATE: 14 BRPM | TEMPERATURE: 99.3 F

## 2020-11-13 DIAGNOSIS — E03.4 HYPOTHYROIDISM DUE TO ACQUIRED ATROPHY OF THYROID: ICD-10-CM

## 2020-11-13 DIAGNOSIS — I10 ESSENTIAL HYPERTENSION: ICD-10-CM

## 2020-11-13 DIAGNOSIS — E66.3 OVERWEIGHT (BMI 25.0-29.9): ICD-10-CM

## 2020-11-13 DIAGNOSIS — Z00.00 ANNUAL PHYSICAL EXAM: Primary | ICD-10-CM

## 2020-11-13 DIAGNOSIS — R73.9 HYPERGLYCEMIA: ICD-10-CM

## 2020-11-13 LAB — HBA1C MFR BLD HPLC: 5.5 %

## 2020-11-13 PROCEDURE — 83036 HEMOGLOBIN GLYCOSYLATED A1C: CPT | Performed by: INTERNAL MEDICINE

## 2020-11-13 PROCEDURE — 99396 PREV VISIT EST AGE 40-64: CPT | Performed by: INTERNAL MEDICINE

## 2020-11-13 RX ORDER — LEVOTHYROXINE SODIUM 88 UG/1
TABLET ORAL
Qty: 90 TAB | Refills: 1 | Status: SHIPPED | OUTPATIENT
Start: 2020-11-13

## 2020-11-13 RX ORDER — LOSARTAN POTASSIUM AND HYDROCHLOROTHIAZIDE 12.5; 5 MG/1; MG/1
1 TABLET ORAL 2 TIMES DAILY
Qty: 180 TAB | Refills: 1 | Status: SHIPPED | OUTPATIENT
Start: 2020-11-13

## 2020-11-13 NOTE — PATIENT INSTRUCTIONS
DASH Diet: Care Instructions Your Care Instructions The DASH diet is an eating plan that can help lower your blood pressure. DASH stands for Dietary Approaches to Stop Hypertension. Hypertension is high blood pressure. The DASH diet focuses on eating foods that are high in calcium, potassium, and magnesium. These nutrients can lower blood pressure. The foods that are highest in these nutrients are fruits, vegetables, low-fat dairy products, nuts, seeds, and legumes. But taking calcium, potassium, and magnesium supplements instead of eating foods that are high in those nutrients does not have the same effect. The DASH diet also includes whole grains, fish, and poultry. The DASH diet is one of several lifestyle changes your doctor may recommend to lower your high blood pressure. Your doctor may also want you to decrease the amount of sodium in your diet. Lowering sodium while following the DASH diet can lower blood pressure even further than just the DASH diet alone. Follow-up care is a key part of your treatment and safety. Be sure to make and go to all appointments, and call your doctor if you are having problems. It's also a good idea to know your test results and keep a list of the medicines you take. How can you care for yourself at home? Following the DASH diet · Eat 4 to 5 servings of fruit each day. A serving is 1 medium-sized piece of fruit, ½ cup chopped or canned fruit, 1/4 cup dried fruit, or 4 ounces (½ cup) of fruit juice. Choose fruit more often than fruit juice. · Eat 4 to 5 servings of vegetables each day. A serving is 1 cup of lettuce or raw leafy vegetables, ½ cup of chopped or cooked vegetables, or 4 ounces (½ cup) of vegetable juice. Choose vegetables more often than vegetable juice. · Get 2 to 3 servings of low-fat and fat-free dairy each day. A serving is 8 ounces of milk, 1 cup of yogurt, or 1 ½ ounces of cheese. · Eat 6 to 8 servings of grains each day. A serving is 1 slice of bread, 1 ounce of dry cereal, or ½ cup of cooked rice, pasta, or cooked cereal. Try to choose whole-grain products as much as possible. · Limit lean meat, poultry, and fish to 2 servings each day. A serving is 3 ounces, about the size of a deck of cards. · Eat 4 to 5 servings of nuts, seeds, and legumes (cooked dried beans, lentils, and split peas) each week. A serving is 1/3 cup of nuts, 2 tablespoons of seeds, or ½ cup of cooked beans or peas. · Limit fats and oils to 2 to 3 servings each day. A serving is 1 teaspoon of vegetable oil or 2 tablespoons of salad dressing. · Limit sweets and added sugars to 5 servings or less a week. A serving is 1 tablespoon jelly or jam, ½ cup sorbet, or 1 cup of lemonade. · Eat less than 2,300 milligrams (mg) of sodium a day. If you limit your sodium to 1,500 mg a day, you can lower your blood pressure even more. Tips for success · Start small. Do not try to make dramatic changes to your diet all at once. You might feel that you are missing out on your favorite foods and then be more likely to not follow the plan. Make small changes, and stick with them. Once those changes become habit, add a few more changes. · Try some of the following: ? Make it a goal to eat a fruit or vegetable at every meal and at snacks. This will make it easy to get the recommended amount of fruits and vegetables each day. ? Try yogurt topped with fruit and nuts for a snack or healthy dessert. ? Add lettuce, tomato, cucumber, and onion to sandwiches. ? Combine a ready-made pizza crust with low-fat mozzarella cheese and lots of vegetable toppings. Try using tomatoes, squash, spinach, broccoli, carrots, cauliflower, and onions. ? Have a variety of cut-up vegetables with a low-fat dip as an appetizer instead of chips and dip. ? Sprinkle sunflower seeds or chopped almonds over salads.  Or try adding chopped walnuts or almonds to cooked vegetables. ? Try some vegetarian meals using beans and peas. Add garbanzo or kidney beans to salads. Make burritos and tacos with mashed lucas beans or black beans. Where can you learn more? Go to http://www.gray.com/ Enter Q855 in the search box to learn more about \"DASH Diet: Care Instructions. \" Current as of: December 16, 2019               Content Version: 12.6 © 2300-9059 [employer]. Care instructions adapted under license by [employer] (which disclaims liability or warranty for this information). If you have questions about a medical condition or this instruction, always ask your healthcare professional. [address] any warranty or liability for your use of this information.

## 2020-11-13 NOTE — PROGRESS NOTES
Brian Manuel is a 64 y.o. female (: 1960) presenting to address:    Chief Complaint   Patient presents with    Complete Physical       Vitals:    20 0759   BP: (!) 142/94   Pulse: 71   Resp: 14   Temp: 99.3 °F (37.4 °C)   TempSrc: Temporal   SpO2: 97%   Weight: 173 lb (78.5 kg)   Height: 5' 4.75\" (1.645 m)   PainSc:   0 - No pain   LMP: 10/18/2011       Is someone accompanying this pt? NO    Is the patient using any DME equipment during OV? NO    Hearing/Vision:   No exam data present    Learning Assessment:     Learning Assessment 2015   PRIMARY LEARNER Patient   HIGHEST LEVEL OF EDUCATION - PRIMARY LEARNER  -   BARRIERS PRIMARY LEARNER -   CO-LEARNER CAREGIVER -   PRIMARY LANGUAGE ENGLISH   LEARNER PREFERENCE PRIMARY LISTENING   ANSWERED BY patient   RELATIONSHIP SELF     Depression Screening:     3 most recent PHQ Screens 2020   Little interest or pleasure in doing things Not at all   Feeling down, depressed, irritable, or hopeless Several days   Total Score PHQ 2 1     Fall Risk Assessment:     Fall Risk Assessment, last 12 mths 2019   Able to walk? Yes   Fall in past 12 months? No     Coordination of Care Questionaire:   1. Have you been to the ER, urgent care clinic since your last visit? Hospitalized since your last visit? NO    2. Have you seen or consulted any other health care providers outside of the [address] [place] [address] since your last visit? Include any pap smears or colon screening. NO    Advanced Directive:   1. Do you have an Advanced Directive? NO    2. Would you like information on Advanced Directives? NO    Patient DECLINED flu vaccine.

## 2020-11-13 NOTE — PROGRESS NOTES
SUBJECTIVE:   64 y.o. female for annual routine checkup. Pt has struggled with her weight. Does not want a flu vaccine. Has been under a lot of stress, feels like her memory is not as good as it should be. Is doing different mind games and puzzles. LFTs are better. Had a full w/u with GI, they felt it was all fatty liver related, as per pt. Glucose mildly elevated. She is concerned about risk for diabetes, has a strong fam hx of this. Current Outpatient Medications   Medication Sig Dispense Refill    cyanocobalamin, vitamin B-12, (VITAMIN B-12 PO) Take  by mouth.  multivit-min/vit C/herb no. 124 (AIRBORNE GUMMY PO) Take  by mouth.  losartan-hydroCHLOROthiazide (HYZAAR) 50-12.5 mg per tablet Take 1 Tab by mouth two (2) times a day. take 1 tablet by mouth once daily 180 Tab 1    levothyroxine (SYNTHROID) 88 mcg tablet take 1 tablet by mouth once daily BEFORE BREAKFAST 90 Tab 1    naltrexone-buPROPion (CONTRAVE) 8-90 mg TbER ER tablet First Month: Contrave 8mg/90mg #70  Week 1 : 1 Tab AM  Week 2 : 1 Tab AM, 1 Tab PM  Week 3 : 2 Tab AM, 1 Tab PM  Week 4 : 2 Tab AM, 2 Tab PM  Week 5 and Beyond: Contrave 8mg/90mg #120   2 Tab AM, 2 Tab  Tab 2    alendronate (FOSAMAX) 70 mg tablet take 1 tablet by mouth every 7 days 12 Tab 0    rosuvastatin (CRESTOR) 10 mg tablet take 1 tablet by mouth at bedtime 90 Tab 0    escitalopram oxalate (LEXAPRO) 5 mg tablet take 1 tablet by mouth once daily 90 Tab 0    montelukast (SINGULAIR) 10 mg tablet take 1 tablet by mouth once daily 90 Tab 1    cholecalciferol, vitamin D3, (VITAMIN D3) 2,000 unit tab Take  by mouth daily.  multivitamin (ONE A DAY) tablet Take 1 tablet by mouth daily.  clindamycin (CLEOCIN) 300 mg capsule Take 300 mg by mouth three (3) times daily. Past Medical History:   Diagnosis Date    Anxiety 1/16/2010    Cervical herniated disc 4/28/2014    Dr. Amalia Parnell ([address] [place] [address]).     Endometriosis 10/28/2011    Fatty liver 7/28/2017    HTN (hypertension) 1/16/2010    Hypercholesterolemia     Hyperlipidemia 1/16/2010    Hypothyroidism 1/22/2015    Lupus ([address] 1/16/2010    Migraine 1/16/2010    Refused influenza vaccine 9/6/2018    Sinusitis        Allergies: Ancef [cefazolin]; Bactrim [sulfamethoxazole-trimethoprim]; Eryc [erythromycin]; Lisinopril; Pcn [penicillins]; Probiotic [lactobacillus acidophilus]; and Soybean oil   Patient's last menstrual period was 10/18/2011. ROS:  Feeling well. No dyspnea or chest pain on exertion. No abdominal pain, change in bowel habits, black or bloody stools. No urinary tract symptoms. GYN ROS: no breast pain or new or enlarging lumps on self exam. No neurological complaints. OBJECTIVE:   The patient appears well, alert, oriented x 3, in no distress. Visit Vitals  BP (!) 142/94 (BP 1 Location: Left arm, BP Patient Position: Sitting)   Pulse 71   Temp 99.3 °F (37.4 °C) (Temporal)   Resp 14   Ht 5' 4.75\" (1.645 m)   Wt 173 lb (78.5 kg)   LMP 10/18/2011   SpO2 97%   BMI 29.01 kg/m²       General appearance: alert, cooperative, no distress, appears stated age  Head: Normocephalic, without obvious abnormality, atraumatic  Ears: normal TM's and external ear canals AU  Throat: Lips, mucosa, and tongue normal. Teeth and gums normal  Neck: supple, symmetrical, trachea midline, no adenopathy, thyroid: not enlarged, symmetric, no tenderness/mass/nodules, no carotid bruit and no JVD  Back: symmetric, no curvature. ROM normal. No CVA tenderness. Lungs: clear to auscultation bilaterally  Breasts: not examined. Heart: regular rate and rhythm, S1, S2 normal, no murmur, click, rub or gallop  Abdomen: soft, non-tender. Bowel sounds normal. No masses,  no organomegaly  Extremities: extremities normal, atraumatic, no cyanosis or edema  Pulses: 2+ and symmetric  Skin: Skin color, texture, turgor normal. No rashes or lesions  Neurological is normal, no focal findings.            Lab Results   Component Value Date/Time    WBC 5.1 11/05/2020 12:00 AM    HGB 14.4 11/05/2020 12:00 AM    HCT 43.3 11/05/2020 12:00 AM    PLATELET 127 40/38/3871 12:00 AM    MCV 93 11/05/2020 12:00 AM         Lab Results   Component Value Date/Time    Sodium 143 11/05/2020 12:00 AM    Potassium 4.0 11/05/2020 12:00 AM    Chloride 103 11/05/2020 12:00 AM    CO2 23 11/05/2020 12:00 AM    Anion gap 6 11/07/2019 07:40 AM    Glucose 104 (H) 11/05/2020 12:00 AM    BUN 9 11/05/2020 12:00 AM    Creatinine 0.64 11/05/2020 12:00 AM    BUN/Creatinine ratio 14 11/05/2020 12:00 AM    GFR est  11/05/2020 12:00 AM    GFR est non-AA 99 11/05/2020 12:00 AM    Calcium 9.6 11/05/2020 12:00 AM         Lab Results   Component Value Date/Time    ALT (SGPT) 99 (H) 11/05/2020 12:00 AM    Alk. phosphatase 91 11/05/2020 12:00 AM    Bilirubin, direct 0.39 11/05/2020 12:00 AM    Bilirubin, total 1.4 (H) 11/05/2020 12:00 AM         Lab Results   Component Value Date/Time    Cholesterol, total 170 11/05/2020 12:00 AM    HDL Cholesterol 70 11/05/2020 12:00 AM    LDL, calculated 83.4 11/07/2019 07:40 AM    LDL Chol Calc (NIH) 86 11/05/2020 12:00 AM    VLDL, calculated 18.6 11/07/2019 07:40 AM    VLDL Cholesterol [place] 14 11/05/2020 12:00 AM    Triglyceride 73 11/05/2020 12:00 AM    CHOL/HDL Ratio 2.4 11/07/2019 07:40 AM         Lab Results   Component Value Date/Time    TSH 0.655 11/05/2020 12:00 AM    T4, Free 1.36 11/05/2020 12:00 AM         Lab Results   Component Value Date/Time    Vitamin D 25-Hydroxy 59.6 11/07/2019 07:40 AM    VITAMIN D, 25-HYDROXY 59.3 11/05/2020 12:00 AM             ASSESSMENT:   Diagnoses and all orders for this visit:    1. Annual physical exam    2. Essential hypertension  -     losartan-hydroCHLOROthiazide (HYZAAR) 50-12.5 mg per tablet; Take 1 Tab by mouth two (2) times a day. take 1 tablet by mouth once daily    3.  Hypothyroidism due to acquired atrophy of thyroid  -     levothyroxine (SYNTHROID) 88 mcg tablet; take 1 tablet by mouth once daily BEFORE BREAKFAST    4. Overweight (BMI 25.0-29.9)    5. Hyperglycemia  -     AMB POC HEMOGLOBIN A1C    Other orders  -     naltrexone-buPROPion (CONTRAVE) 8-90 mg TbER ER tablet; First Month: Contrave 8mg/90mg #70  Week 1 : 1 Tab AM  Week 2 : 1 Tab AM, 1 Tab PM  Week 3 : 2 Tab AM, 1 Tab PM  Week 4 : 2 Tab AM, 2 Tab PM  Week 5 and Beyond: Contrave 8mg/90mg #120   2 Tab AM, 2 Tab PM          PLAN:   Mammogram: utd. DEXA: utd, started her this yr on fosamax. Colonoscopy: utd. Pap: with gyn. Will increase her Hyzaar to BID since BP has been running higher. Will try her on Contrave, if not too expensive. The plan was discussed with the patient. The patient verbalized understanding and is in agreement with the plan. All medication potential side effects were discussed with the patient.

## 2020-11-19 ENCOUNTER — TELEPHONE (OUTPATIENT)
Dept: FAMILY MEDICINE CLINIC | Age: 60
End: 2020-11-19

## 2020-11-19 NOTE — TELEPHONE ENCOUNTER
Insurance is requesting office notes and what patients  physical risk factors to explain  why she needed a bone density . Records can be faxed to [facility] with [facility] records . Fax [phone] .

## 2020-11-20 NOTE — TELEPHONE ENCOUNTER
Pt has osteopenia, seen on DEXA in 2017. Since when do they not cover a basic health maintenance test like this?

## 2021-01-28 DIAGNOSIS — Q78.2 OSTEOPETROSIS: Primary | ICD-10-CM

## 2021-01-31 RX ORDER — ALENDRONATE SODIUM 70 MG/1
TABLET ORAL
Qty: 12 TAB | Refills: 0 | Status: SHIPPED | OUTPATIENT
Start: 2021-01-31

## 2021-02-17 ENCOUNTER — TELEPHONE (OUTPATIENT)
Dept: FAMILY MEDICINE CLINIC | Age: 61
End: 2021-02-17

## 2021-02-17 DIAGNOSIS — M85.80 OSTEOPENIA, UNSPECIFIED LOCATION: ICD-10-CM

## 2021-02-17 DIAGNOSIS — Z13.820 OSTEOPOROSIS SCREENING: Primary | ICD-10-CM

## 2021-02-17 DIAGNOSIS — Z78.0 POSTMENOPAUSAL: ICD-10-CM

## 2021-02-17 NOTE — TELEPHONE ENCOUNTER
ICD-10-CM ICD-9-CM   1. Osteoporosis screening  Z13.820 V82.81   2. Osteopenia, unspecified location  M85.80 733.90   3.  Postmenopausal  Z78.0 V49.81

## 2021-02-17 NOTE — TELEPHONE ENCOUNTER
Dr Brenna Bonilla ordered a bone density scan for the patient due to her having osteoporosis. She got it done at [facility] on 8/11/2020 and received a bill for $150. The order was ordered under routine so it wasn't covered by her ins. They told her that it needs to be recoded for \"increased risk\". She gave me the dx code of [id number] to use. Would you be able to reorder the bone density and recode it?

## 2021-02-17 NOTE — TELEPHONE ENCOUNTER
They sent me a form to fill out. I'll Just have to have you sign it tomorrow. Should I use all 3 dx codes or just the first one?

## 2021-02-18 NOTE — TELEPHONE ENCOUNTER
Faxed form to back to the number listed on it. Did receive confirmation that it went through. I spoke to the patient and informed her that the form was submitted for reprocessing and that everything should be taken care of now. I am going to scan a copy of the form to her chart.

## 2021-04-09 DIAGNOSIS — F32.0 MILD SINGLE CURRENT EPISODE OF MAJOR DEPRESSIVE DISORDER (HCC): ICD-10-CM

## 2021-04-11 RX ORDER — ESCITALOPRAM OXALATE 5 MG/1
TABLET ORAL
Qty: 90 TAB | Refills: 0 | Status: SHIPPED | OUTPATIENT
Start: 2021-04-11

## 2021-04-15 ENCOUNTER — TELEPHONE (OUTPATIENT)
Dept: FAMILY MEDICINE CLINIC | Age: 61
End: 2021-04-15

## 2021-04-15 NOTE — TELEPHONE ENCOUNTER
Pt called back about the billing issue she had from her bone density done in [place]. She stated she receieved another bill for it. I called the coding audit # which is [phone]. I got a vm and left a message asking them to call me back.

## 2021-04-18 DIAGNOSIS — Q78.2 OSTEOPETROSIS: ICD-10-CM

## 2021-04-18 RX ORDER — ALENDRONATE SODIUM 70 MG/1
TABLET ORAL
Qty: 12 TAB | Refills: 0 | OUTPATIENT
Start: 2021-04-18

## 2021-05-28 DIAGNOSIS — E03.4 HYPOTHYROIDISM DUE TO ACQUIRED ATROPHY OF THYROID: ICD-10-CM

## 2021-05-29 RX ORDER — LEVOTHYROXINE SODIUM 88 UG/1
TABLET ORAL
Qty: 90 TABLET | Refills: 1 | OUTPATIENT
Start: 2021-05-29

## 2021-07-05 DIAGNOSIS — F32.0 MILD SINGLE CURRENT EPISODE OF MAJOR DEPRESSIVE DISORDER (HCC): ICD-10-CM

## 2021-07-07 RX ORDER — ESCITALOPRAM OXALATE 5 MG/1
TABLET ORAL
Qty: 90 TABLET | Refills: 0 | OUTPATIENT
Start: 2021-07-07